# Patient Record
Sex: FEMALE | Race: WHITE | NOT HISPANIC OR LATINO | Employment: FULL TIME | URBAN - METROPOLITAN AREA
[De-identification: names, ages, dates, MRNs, and addresses within clinical notes are randomized per-mention and may not be internally consistent; named-entity substitution may affect disease eponyms.]

---

## 2017-03-03 ENCOUNTER — COMMUNICATION - HEALTHEAST (OUTPATIENT)
Dept: FAMILY MEDICINE | Facility: CLINIC | Age: 22
End: 2017-03-03

## 2017-03-28 ENCOUNTER — COMMUNICATION - HEALTHEAST (OUTPATIENT)
Dept: FAMILY MEDICINE | Facility: CLINIC | Age: 22
End: 2017-03-28

## 2017-04-04 ENCOUNTER — OFFICE VISIT - HEALTHEAST (OUTPATIENT)
Dept: FAMILY MEDICINE | Facility: CLINIC | Age: 22
End: 2017-04-04

## 2017-04-04 DIAGNOSIS — K59.00 CONSTIPATION: ICD-10-CM

## 2017-04-04 DIAGNOSIS — K92.1 BLOOD IN STOOL: ICD-10-CM

## 2017-04-04 DIAGNOSIS — R10.84 GENERALIZED ABDOMINAL PAIN: ICD-10-CM

## 2017-04-04 ASSESSMENT — MIFFLIN-ST. JEOR: SCORE: 1519.69

## 2017-04-07 ENCOUNTER — HOSPITAL ENCOUNTER (OUTPATIENT)
Dept: CT IMAGING | Facility: CLINIC | Age: 22
Discharge: HOME | End: 2017-04-07
Attending: NURSE PRACTITIONER

## 2017-04-07 DIAGNOSIS — R10.84 GENERALIZED ABDOMINAL PAIN: ICD-10-CM

## 2017-04-07 DIAGNOSIS — K59.00 CONSTIPATION: ICD-10-CM

## 2017-04-07 DIAGNOSIS — K92.1 BLOOD IN STOOL: ICD-10-CM

## 2017-05-31 ENCOUNTER — OFFICE VISIT (OUTPATIENT)
Dept: INTERNAL MEDICINE | Facility: CLINIC | Age: 22
End: 2017-05-31

## 2017-05-31 VITALS
TEMPERATURE: 97.8 F | BODY MASS INDEX: 20.06 KG/M2 | HEART RATE: 70 BPM | SYSTOLIC BLOOD PRESSURE: 109 MMHG | WEIGHT: 148.1 LBS | OXYGEN SATURATION: 99 % | DIASTOLIC BLOOD PRESSURE: 75 MMHG | RESPIRATION RATE: 18 BRPM | HEIGHT: 72 IN

## 2017-05-31 DIAGNOSIS — R53.83 OTHER FATIGUE: ICD-10-CM

## 2017-05-31 DIAGNOSIS — K59.09 OTHER CONSTIPATION: Primary | ICD-10-CM

## 2017-05-31 LAB
DEPRECATED CALCIDIOL+CALCIFEROL SERPL-MC: 60 UG/L (ref 20–75)
T4 FREE SERPL-MCNC: 0.9 NG/DL (ref 0.76–1.46)
TSH SERPL DL<=0.05 MIU/L-ACNC: 0.57 MU/L (ref 0.4–4)

## 2017-05-31 ASSESSMENT — PAIN SCALES - GENERAL: PAINLEVEL: MILD PAIN (2)

## 2017-05-31 NOTE — NURSING NOTE
Chief Complaint   Patient presents with     Consult     Here for consultation. Patient been having GI issues     Lion Nelson CMA at 11:56 AM on 5/31/2017

## 2017-05-31 NOTE — MR AVS SNAPSHOT
After Visit Summary   5/31/2017    Araceli Sellers    MRN: [id number]           Patient Information     Date Of Birth          1995        Visit Information        Provider Department      5/31/2017 12:00 PM Margaret Hendrickson MD [facility]        Today's Diagnoses     Other constipation    -  1    Other fatigue          Care Instructions    Recommendations:    Dr. Lisa Musa (PCP, IUD)  Labs today     Bowels:  Stop miralax and citrucel, no laxatives   Probiotic - lactobacillus and bifidobacterium (several strains), 30 B colonies daily. RAW, megaFlora  Breathing - before eating.     Diet recommendations:  Cooked greens - 2 c/day, any kind  Reduce the banana  Gluten free and diary free x 1 month  More cooked than raw veg   2-3 containers of water  Try to avoid the GF products  GF grains - oats (can be), millet, quinoa, rice (red, black, brown)    For sleep:  mangesium - at least 2 tsp BID   Consider Mag supplement (not powder) for before bed?           Follow-ups after your visit        Follow-up notes from your care team     Return in about 4 weeks (around 6/28/2017).      Your next 10 appointments already scheduled     May 31, 2017  1:30 PM CDT   LAB with [facility] ([facility])    [address] [place] [id number] [phone]           Patient must bring picture ID.  Patient should be prepared to give a urine specimen  Please do not eat 10-12 hours before your appointment if you are coming in fasting for labs on lipids, cholesterol, or glucose (sugar).  Pregnant women should follow their Care Team instructions. Water with medications is okay. Do not drink coffee or other fluids.   If you have concerns about taking  your medications, please ask at office or if scheduling via Next Caller, send a message by clicking on Secure Messaging, Message Your Care Team.            Jul 26, 2017  9:00 AM CDT  "  (Arrive by 8:45 AM)   Return Lifestyle with Margaret Hendrickson MD   [facility] ([facility])    [address]  [address] [facility] [address] [phone]              Who to contact     Please call your clinic at [phone] to:    Ask questions about your health    Make or cancel appointments    Discuss your medicines    Learn about your test results    Speak to your doctor   If you have compliments or concerns about an experience at your clinic, or if you wish to file a complaint, please contact [facility] Patient Relations at [phone] or email us at [email or web address]         Additional Information About Your Visit        Alt[employer] [email or web address] Information     Protecode is an electronic gateway that provides easy, online access to your medical records. With Protecode, you can request a clinic appointment, read your test results, renew a prescription or communicate with your care team.     To sign up for Protecode visit the website at [email or web address]   You will be asked to enter the access code listed below, as well as some personal information. Please follow the directions to create your username and password.     Your access code is: [email or web address]  Expires: 8/15/2017  6:30 AM     Your access code will  in 90 days. If you need help or a new code, please contact your [facility] or call [phone] for assistance.        Care EveryWhere ID     This is your Care EveryWhere ID. This could be used by other organizations to access your [facility] medical records  [id number]        Your Vitals Were     Pulse Temperature Respirations Height Pulse Oximetry Breastfeeding?    70 97.8  F (36.6  C) (Oral) 18 1.816 m (5' 11.5\") 99% No    BMI (Body Mass Index)                   20.37 kg/m2            Blood Pressure from Last 3 Encounters:   17 109/75   13 109/77    Weight from " Last 3 Encounters:   05/31/17 67.2 kg (148 lb 1.6 oz)   09/19/13 68.7 kg (151 lb 6.4 oz)              We Performed the Following     T3 Free     T4 free     TSH     Vitamin D deficiency screening          Today's Medication Changes          These changes are accurate as of: 5/31/17  1:27 PM.  If you have any questions, ask your nurse or doctor.               Stop taking these medicines if you haven't already. Please contact your care team if you have questions.     EFFEXOR 37.5 MG tablet   Generic drug:  venlafaxine   Stopped by:  Margaret Hendrickson MD                    Primary Care Provider    None Specified       No primary provider on file.        Thank you!     Thank you for choosing [facility]  for your care. Our goal is always to provide you with excellent care. Hearing back from our patients is one way we can continue to improve our services. Please take a few minutes to complete the written survey that you may receive in the mail after your visit with us. Thank you!             Your Updated Medication List - Protect others around you: Learn how to safely use, store and throw away your medicines at www.disposemymeds.org.          This list is accurate as of: 5/31/17  1:27 PM.  Always use your most recent med list.                   Brand Name Dispense Instructions for use    CELEXA PO      Take 10 mg by mouth daily       MELATONIN      1 tablet       NASONEX 50 MCG/ACT spray   Generic drug:  mometasone      Spray 2 sprays into both nostrils daily

## 2017-05-31 NOTE — PATIENT INSTRUCTIONS
Recommendations:    Dr. Lisa Musa (PCP, IUD)  Labs today     Bowels:  Stop miralax and citrucel, no laxatives   Probiotic - lactobacillus and bifidobacterium (several strains), 30 B colonies daily. RAW, megaFlora  Breathing - before eating.     Diet recommendations:  Cooked greens - 2 c/day, any kind  Reduce the banana  Gluten free and diary free x 1 month  More cooked than raw veg   2-3 containers of water  Try to avoid the GF products  GF grains - oats (can be), millet, quinoa, rice (red, black, brown)    For sleep:  mangesium - at least 2 tsp BID   Consider Mag supplement (not powder) for before bed?
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
## 2017-06-01 ENCOUNTER — TELEPHONE (OUTPATIENT)
Dept: INTERNAL MEDICINE | Facility: CLINIC | Age: 22
End: 2017-06-01

## 2017-06-01 LAB — T3FREE SERPL-MCNC: 2.6 PG/ML (ref 2.3–4.2)

## 2017-06-08 ASSESSMENT — ENCOUNTER SYMPTOMS
TROUBLE SWALLOWING: 0
SINUS CONGESTION: 0
ALTERED TEMPERATURE REGULATION: 1
BLOATING: 1
CHILLS: 0
CONSTIPATION: 1
POLYDIPSIA: 0
BLOOD IN STOOL: 0
HEARTBURN: 1
VOMITING: 1
RECTAL PAIN: 0
ABDOMINAL PAIN: 1
SMELL DISTURBANCE: 0
SORE THROAT: 0
JAUNDICE: 0
INCREASED ENERGY: 0
HOARSE VOICE: 0
WEIGHT GAIN: 0
RECTAL BLEEDING: 0
BOWEL INCONTINENCE: 0
TASTE DISTURBANCE: 0
DECREASED APPETITE: 1
NAIL CHANGES: 0
FATIGUE: 1
WEIGHT LOSS: 0
NAUSEA: 1
DIARRHEA: 0
HALLUCINATIONS: 0
NIGHT SWEATS: 0
POLYPHAGIA: 0
FEVER: 0
SINUS PAIN: 0
NECK MASS: 0
POOR WOUND HEALING: 0
SKIN CHANGES: 0

## 2017-06-08 ASSESSMENT — ACTIVITIES OF DAILY LIVING (ADL)
ARE_THERE_CARBON_MONOXIDE_DETECTORS_IN_YOUR_HOME?: N
ARE_THERE_SMOKE_DETECTORS_IN_YOUR_HOME?: N
ARE_THERE_FIREARMS_IN_YOUR_HOME?: N
DO_MEMBERS_OF_YOUR_HOUSEHOLD_USE_SUNSCREEN?: N
DO_MEMBERS_OF_YOUR_HOUSEHOLD_USE_SAFETY_HELMETS?: Y
DO_MEMBERS_OF_YOUR_HOUSEHOLD_WEAR_SEAT_BELTS?: Y

## 2017-06-13 ENCOUNTER — OFFICE VISIT (OUTPATIENT)
Dept: INTERNAL MEDICINE | Facility: CLINIC | Age: 22
End: 2017-06-13

## 2017-06-13 VITALS
HEART RATE: 92 BPM | WEIGHT: 143 LBS | BODY MASS INDEX: 19.37 KG/M2 | HEIGHT: 72 IN | SYSTOLIC BLOOD PRESSURE: 104 MMHG | OXYGEN SATURATION: 98 % | RESPIRATION RATE: 18 BRPM | DIASTOLIC BLOOD PRESSURE: 70 MMHG | TEMPERATURE: 98.1 F

## 2017-06-13 DIAGNOSIS — K59.00 CONSTIPATION, UNSPECIFIED CONSTIPATION TYPE: Primary | ICD-10-CM

## 2017-06-13 DIAGNOSIS — Z30.432 ENCOUNTER FOR IUD REMOVAL: ICD-10-CM

## 2017-06-13 DIAGNOSIS — R63.4 LOSS OF WEIGHT: ICD-10-CM

## 2017-06-13 ASSESSMENT — ENCOUNTER SYMPTOMS
PALPITATIONS: 0
SKIN CHANGES: 0
SINUS PAIN: 0
POLYPHAGIA: 0
SLEEP DISTURBANCES DUE TO BREATHING: 0
HYPERTENSION: 0
POSTURAL DYSPNEA: 0
NAUSEA: 1
STIFFNESS: 0
TACHYCARDIA: 0
HOT FLASHES: 0
INSOMNIA: 0
DOUBLE VISION: 0
EYE WATERING: 0
TROUBLE SWALLOWING: 0
BRUISES/BLEEDS EASILY: 0
DISTURBANCES IN COORDINATION: 0
DECREASED APPETITE: 1
HEMOPTYSIS: 0
HEMATURIA: 0
SORE THROAT: 0
TASTE DISTURBANCE: 0
BREAST PAIN: 0
BREAST MASS: 0
EYE REDNESS: 0
MYALGIAS: 0
PANIC: 0
NERVOUS/ANXIOUS: 0
NAIL CHANGES: 0
HOARSE VOICE: 0
MEMORY LOSS: 0
HALLUCINATIONS: 0
BOWEL INCONTINENCE: 0
RECTAL BLEEDING: 0
EXTREMITY NUMBNESS: 0
MUSCLE WEAKNESS: 0
ORTHOPNEA: 0
BLOATING: 1
WHEEZING: 0
POOR WOUND HEALING: 0
PARALYSIS: 0
DYSURIA: 0
ARTHRALGIAS: 0
BACK PAIN: 0
TREMORS: 0
INCREASED ENERGY: 0
EYE PAIN: 0
JOINT SWELLING: 0
COUGH DISTURBING SLEEP: 0
BLOOD IN STOOL: 0
NECK MASS: 0
EYE IRRITATION: 0
RESPIRATORY PAIN: 0
SINUS CONGESTION: 0
SHORTNESS OF BREATH: 0
NUMBNESS: 0
TINGLING: 0
SMELL DISTURBANCE: 0
SPUTUM PRODUCTION: 0
LEG PAIN: 0
FLANK PAIN: 0
LOSS OF CONSCIOUSNESS: 0
WEIGHT LOSS: 0
CHILLS: 0
SNORES LOUDLY: 0
ABDOMINAL PAIN: 1
WEIGHT GAIN: 0
CONSTIPATION: 1
DECREASED LIBIDO: 0
RECTAL PAIN: 0
WEAKNESS: 0
NECK PAIN: 0
DIARRHEA: 0
HYPOTENSION: 0
DIZZINESS: 0
FATIGUE: 1
SEIZURES: 0
CLAUDICATION: 0
LIGHT-HEADEDNESS: 0
JAUNDICE: 0
EXERCISE INTOLERANCE: 0
VOMITING: 1
COUGH: 0
DYSPNEA ON EXERTION: 0
DEPRESSION: 0
LEG SWELLING: 0
MUSCLE CRAMPS: 0
DIFFICULTY URINATING: 0
SYNCOPE: 0
ALTERED TEMPERATURE REGULATION: 1
FEVER: 0
NIGHT SWEATS: 0
HEADACHES: 0
POLYDIPSIA: 0
SWOLLEN GLANDS: 0
HEARTBURN: 1
SPEECH CHANGE: 0
DECREASED CONCENTRATION: 0

## 2017-06-13 ASSESSMENT — PAIN SCALES - GENERAL: PAINLEVEL: NO PAIN (0)

## 2017-06-13 NOTE — PATIENT INSTRUCTIONS
Primary [facility] Medication Refill Request Information:  * Please contact your pharmacy regarding ANY request for medication refills.  ** [facility] Prescription Fax = [phone]  * Please allow 3 business days for routine medication refills.  * Please allow 5 business days for controlled substance medication refills.     [facility] Test Result notification information:  *You will be notified with in 7-10 days of your appointment day regarding the results of your test.  If you are on MyChart you will be notified as soon as the provider has reviewed the results and signed off on them.    Follow up with Dr. Castle after Colonoscopy at [facility] [phone]       Colonoscopy at [facility]   Endoscopy Department  [facility]  Floor 1, Unit [address], Room [address] [place] [id number]  Phone: [phone]

## 2017-06-13 NOTE — MR AVS SNAPSHOT
After Visit Summary   6/13/2017    Araceli Sellers    MRN: [id number]           Patient Information     Date Of Birth          1995        Visit Information        Provider Department      6/13/2017 4:30 PM Lisa Velasquez MD [facility]        Today's Diagnoses     Constipation, unspecified constipation type    -  1    Loss of weight        Encounter for IUD removal          Care Instructions    Primary Care Center Medication Refill Request Information:  * Please contact your pharmacy regarding ANY request for medication refills.  ** [facility] Prescription Fax = [phone]  * Please allow 3 business days for routine medication refills.  * Please allow 5 business days for controlled substance medication refills.     Primary Care Center Test Result notification information:  *You will be notified with in 7-10 days of your appointment day regarding the results of your test.  If you are on MyChart you will be notified as soon as the provider has reviewed the results and signed off on them.    Follow up with Dr. Castle after Colonoscopy at [facility] [phone]       Colonoscopy at [facility]   Endoscopy Department  [facility]  Floor 1, [address], Room [address] [place] [address]  Phone: [phone]            Follow-ups after your visit        Additional Services     GASTROENTEROLOGY ADULT REF PROCEDURE ONLY       Last Lab Result: No results found for: CR  Body mass index is 19.67 kg/(m^2).     Needed:  No  Language:  English    Patient will be contacted to schedule procedure.     Please be aware that coverage of these services is subject to the terms and limitations of your health insurance plan.  Call member services at your health plan with any benefit or coverage questions.  Any procedures must be performed at a [facility] facility OR coordinated by your clinic's referral  office.    Please bring the following with you to your appointment:    (1) Any X-Rays, CTs or MRIs which have been performed.  Contact the facility where they were done to arrange for  prior to your scheduled appointment.    (2) List of current medications   (3) This referral request   (4) Any documents/labs given to you for this referral                  Your next 10 appointments already scheduled     Jul 26, 2017  9:00 AM CDT   (Arrive by 8:45 AM)   Return Lifestyle with Margaret Hendrickson MD   [facility] ([facility] [place])    [address]  4th [facility] [address] [phone]              Who to contact     Please call your clinic at [phone] to:    Ask questions about your health    Make or cancel appointments    Discuss your medicines    Learn about your test results    Speak to your doctor   If you have compliments or concerns about an experience at your clinic, or if you wish to file a complaint, please contact [facility] Patient Relations at [phone] or email us at [email or web address]         Additional Information About Your Visit        retsCloudhart Information     Power Plus Communicationst gives you secure access to your electronic health record. If you see a primary care provider, you can also send messages to your care team and make appointments. If you have questions, please call your primary care clinic.  If you do not have a primary care provider, please call [phone] and they will assist you.      Power Plus Communicationst is an electronic gateway that provides easy, online access to your medical records. With Babelgum, you can request a clinic appointment, read your test results, renew a prescription or communicate with your care team.     To access your existing account, please contact your [facility] or call [phone] for assistance.        Care EveryWhere ID     This is your Care  "EveryWhere ID. This could be used by other organizations to access your [place] medical records  [id number]        Your Vitals Were     Pulse Temperature Respirations Height Pulse Oximetry Breastfeeding?    92 98.1  F (36.7  C) (Oral) 18 1.816 m (5' 11.5\") 98% No    BMI (Body Mass Index)                   19.67 kg/m2            Blood Pressure from Last 3 Encounters:   06/13/17 104/70   05/31/17 109/75   09/19/13 109/77    Weight from Last 3 Encounters:   06/13/17 64.9 kg (143 lb)   05/31/17 67.2 kg (148 lb 1.6 oz)   09/19/13 68.7 kg (151 lb 6.4 oz)              We Performed the Following     GASTROENTEROLOGY ADULT REF PROCEDURE ONLY     REMOVE INTRAUTERINE DEVICE        Primary Care Provider Office Phone # Fax #    Lisa Sloane Matthews [phone]       [address]  [facility] [address]        Thank you!     Thank you for choosing [facility]  for your care. Our goal is always to provide you with excellent care. Hearing back from our patients is one way we can continue to improve our services. Please take a few minutes to complete the written survey that you may receive in the mail after your visit with us. Thank you!             Your Updated Medication List - Protect others around you: Learn how to safely use, store and throw away your medicines at www.disposemymeds.org.          This list is accurate as of: 6/13/17  5:32 PM.  Always use your most recent med list.                   Brand Name Dispense Instructions for use    CELEXA PO      Take 10 mg by mouth daily       MELATONIN      1 tablet       NASONEX 50 MCG/ACT spray   Generic drug:  mometasone      Spray 2 sprays into both nostrils daily         "

## 2017-06-13 NOTE — NURSING NOTE
Chief Complaint   Patient presents with     Establish Care     Here to establish care for new PCP     Lion Nelson CMA at 4:43 PM on 6/13/2017

## 2017-06-13 NOTE — PROGRESS NOTES
"      HPI:       Araceli Sellers is a 21 year old female with a history of depression and constipation who presents for the following  Patient presents with: Establish Care (Here to establish care for new PCP)    Problem, Medication and Allergy Lists were reviewed and are current.  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 She was recently seen by Dr. Hendrickson in Integrative Medicine for constipation since February. Prior to that she was being seen at [facility]. Since February she has had an abdominal CT, which per chart review \"revealed copious amount of stool\" despite the patient reportedly using multiple stool medications including Miralax, fiber, and laxatives. After her visit with Dr. Hendrickson she decided to try using only her Magnesium Calm supplement. She reports taking about 900 -1000 mg daily. Currently reports variable stools, including \"long skinny pieces, fluffy pieces, and that it just seems really abnormal.\" Reports daily small bowel movement in the am, but doesn't feel like it's complete. Denies diarrhea unless caused by laxatives. Affirms cramping, bloating, and \"like it's not moving at all.\" Denies sharp pain, blood in the stool, or black stools. Prior to February she thinks she was having daily bowel movements. She also reports weight loss of about 12 lbs since earlier this year. Per chart review she is down 5 lbs since visit on 5/31/17. She affirms decreased appetite related to bloating. She also reports vomiting once in May due to constipation. She affirms exercising regularly and adequate fluid intake.She is wondering if her symptoms could be caused by IBS.      Deisy is not currently sexually active and would like to have her Mirena IUD, which was placed 5 years ago in August, removed today. She does not want to do any other contraception at this time. " Her last pap smear was May 2016 and was NIL.     Patient is a new patient to this clinic and so  I reviewed/updated the Past Medical History, the Family History and the Social History.     Works as a HUC/CNA on 7D at [employer]         Review of Systems:   Review of Systems     Constitutional:  Positive for fatigue, decreased appetite and recent stressors. Negative for fever, chills, weight loss, weight gain, night sweats, height loss, post-operative complications, incisional pain, hallucinations, increased energy, hyperactivity and confused.   HENT:  Positive for nosebleeds. Negative for ear pain, hearing loss, tinnitus, trouble swallowing, hoarse voice, mouth sores, sore throat, ear discharge, tooth pain, gum tenderness, taste disturbance, smell disturbance, hearing aid, bleeding gums, dry mouth, sinus pain, sinus congestion and neck mass.    Eyes:  Negative for double vision, pain, redness, eye pain, decreased vision, eye watering, eye bulging, eye dryness, flashing lights, spots, floaters, strabismus, tunnel vision, jaundice and eye irritation.   Respiratory:   Negative for cough, hemoptysis, sputum production, shortness of breath, wheezing, sleep disturbances due to breathing, snores loudly, respiratory pain, dyspnea on exertion, cough disturbing sleep and postural dyspnea.    Cardiovascular:  Negative for chest pain, dyspnea on exertion, palpitations, orthopnea, claudication, leg swelling, fingers/toes turn blue, hypertension, hypotension, syncope, history of heart murmur, chest pain on exertion, chest pain at rest, pacemaker, few scattered varicosities, leg pain, sleep disturbances due to breathing, tachycardia, light-headedness and exercise intolerance.   Gastrointestinal:  Positive for heartburn, nausea, vomiting, abdominal pain, constipation, bloating and change in stool. Negative for diarrhea, blood in stool, melena, rectal pain, hemorrhoids, bowel incontinence, jaundice, rectal bleeding and coffee ground  "emesis.   Genitourinary:  Negative for bladder incontinence, dysuria, urgency, hematuria, flank pain, vaginal discharge, difficulty urinating, genital sores, dyspareunia, decreased libido, nocturia, voiding less frequently, arousal difficulty, abnormal vaginal bleeding, excessive menstruation, menstrual changes, hot flashes, vaginal dryness and postmenopausal bleeding.   Musculoskeletal:  Negative for myalgias, back pain, joint swelling, arthralgias, stiffness, muscle cramps, neck pain, bone pain, muscle weakness and fracture.   Skin:  Positive for itching, hair changes and acne. Negative for nail changes, poor wound healing, rash, skin changes, warts, poor wound healing, scarring, flaky skin, Raynaud's phenomenon, sensitivity to sunlight and skin thickening.   Neurological:  Negative for dizziness, tingling, tremors, speech change, seizures, loss of consciousness, weakness, light-headedness, numbness, headaches, disturbances in coordination, extremity numbness, memory loss, difficulty walking and paralysis.   Endo/Heme:  Negative for anemia, swollen glands and bruises/bleeds easily.   Psychiatric/Behavioral:  Negative for depression, hallucinations, memory loss, decreased concentration, mood swings and panic attacks.    Breast:  Negative for breast discharge, breast mass, breast pain and nipple retraction.   Endocrine:  Positive for altered temperature regulation.Negative for polyphagia, polydipsia, unwanted hair growth and change in facial hair.     Annual Exam:  Frequency of exercise:: 4-5 days/week  Duration of exercise:: 45-60 minutes         Physical Exam:   /70 (BP Location: Right arm, Patient Position: Chair, Cuff Size: Adult Regular)  Pulse 92  Temp 98.1  F (36.7  C) (Oral)  Resp 18  Ht 1.816 m (5' 11.5\")  Wt 64.9 kg (143 lb)  SpO2 98%  Breastfeeding? No  BMI 19.67 kg/m2  Body mass index is 19.67 kg/(m^2).  Vitals were reviewed     Physical Exam   General: Deisy is alert, cooperative and in no " acute distress.   Skin: No lesions or rashes   Eyes: PERRL.   ENT: TMs are pearly grey, with light reflex and bony landmarks visible, bilaterally. Oropharynx and palate are pink, tonsils +1 without exudate. Uvula rises evenly.  Neck: Thyroid palpable, not enlarged or tender. No lymphadenopathy.  Chest & Lungs: Respirations are unlabored and regular, breath sounds are clear and equal throughout, bilaterally.  Cardiovascular: regular S1 and S2. No murmurs.  Peripheral pulses are +2. No edema.    Abdomen: Bowel sounds active x4 quadrants. No tenderness or palpable masses. No hepatosplenomegaly.   Musculoskeletal: Full ROM in all joints.     Neurological: Alert and oriented X3.   GYN: No vaginal discharge, cervix os normal    Procedure: IUD strings were visualized from cervical os. Strings were grasped with vaginal forceps and IUD was removed in entirety. No complications, tolerated well.     Assessment and Plan     Araceli was seen today for establish care. Discussed with Deisy that IBS is more a diagnosis of exclusion. Given her 4-5 month history of constipation and abdominal discomfort despite aggressive medication regimen I think it would be appropriate to do a colonoscopy to further evaluate etiology, especially with such sudden onset. If colonoscopy is normal, will consider starting medication therapy for IBS symptoms. She would like to proceed with colonoscopy.       Diagnoses and all orders for this visit:    Constipation, unspecified constipation type  -     GASTROENTEROLOGY ADULT REF PROCEDURE ONLY    Loss of weight  -     GASTROENTEROLOGY ADULT REF PROCEDURE ONLY    Encounter for IUD removal  -     REMOVE INTRAUTERINE DEVICE      Options for treatment and follow-up care were reviewed with the patient. Araceli Sellers engaged in the decision making process and verbalized understanding of the options discussed and agreed with the final plan.    Scribe Disclosure:   Yesenia PRATT - NP Student, am serving  as a scribe; to document services personally performed by Dr. Lisa Matthews-based on data collection and the provider's statements to me.     Provider Disclosure:  I agree with above History, Review of Systems, Physical exam and Plan. I have reviewed the content of the documentation and have edited it as needed. I have personally performed the services documented here and the documentation accurately represents those services and the decisions I have made.     Lisa Matthews MD  Jun 13, 2017

## 2017-06-14 ENCOUNTER — TELEPHONE (OUTPATIENT)
Dept: GASTROENTEROLOGY | Facility: CLINIC | Age: 22
End: 2017-06-14

## 2017-06-22 ENCOUNTER — TELEPHONE (OUTPATIENT)
Dept: GASTROENTEROLOGY | Facility: OUTPATIENT CENTER | Age: 22
End: 2017-06-22

## 2017-06-22 NOTE — TELEPHONE ENCOUNTER
Patient taking any blood thinners ? no    Heart disease ? denies    Lung disease ? denies      Sleep apnea ? denies    Diabetic ? denies    Kidney disease ? denies    Dialysis ? n/a    Electronic implanted medical devices ? denies    Are you taking any narcotic pain medication ? no  What is your daily dosage ?    PTSD ?n/a    Prep instructions reviewed with patient ? Instructions,  policy, conscious sedation plan reviewed. patient is doing an extended prep . Advised patient to have someone stay with her post exam. Patient states she is not pregnant or lactating.    Pharmacy : [place]    Indication for procedure :  Associated Diagnoses      Constipation, unspecified constipation type [K59.00]  - Primary         Loss of weight [R63.4]             Referring provider :  Providers      Authorizing Provider Encounter Provider     Lisa Velasquez MD

## 2017-06-29 ENCOUNTER — DOCUMENTATION ONLY (OUTPATIENT)
Dept: GASTROENTEROLOGY | Facility: OUTPATIENT CENTER | Age: 22
End: 2017-06-29

## 2017-06-29 ENCOUNTER — TRANSFERRED RECORDS (OUTPATIENT)
Dept: HEALTH INFORMATION MANAGEMENT | Facility: CLINIC | Age: 22
End: 2017-06-29

## 2017-06-29 DIAGNOSIS — K59.00 CONSTIPATION: Primary | ICD-10-CM

## 2017-06-30 ENCOUNTER — MYC MEDICAL ADVICE (OUTPATIENT)
Dept: INTERNAL MEDICINE | Facility: CLINIC | Age: 22
End: 2017-06-30

## 2017-06-30 DIAGNOSIS — K58.1 IRRITABLE BOWEL SYNDROME WITH CONSTIPATION: Primary | ICD-10-CM

## 2017-07-02 RX ORDER — LUBIPROSTONE 8 UG/1
8 CAPSULE ORAL 2 TIMES DAILY
Qty: 60 CAPSULE | Refills: 3 | Status: SHIPPED | OUTPATIENT
Start: 2017-07-02 | End: 2020-04-28

## 2017-07-03 LAB — COPATH REPORT: NORMAL

## 2017-09-12 DIAGNOSIS — R14.1 FLATULENCE, ERUCTATION, AND GAS PAIN: ICD-10-CM

## 2017-09-12 DIAGNOSIS — R14.3 FLATULENCE, ERUCTATION, AND GAS PAIN: ICD-10-CM

## 2017-09-12 DIAGNOSIS — R14.2 FLATULENCE, ERUCTATION, AND GAS PAIN: ICD-10-CM

## 2017-09-13 LAB
H PYLORI AG STL QL IA: NORMAL
SPECIMEN SOURCE: NORMAL

## 2017-09-21 ENCOUNTER — OFFICE VISIT (OUTPATIENT)
Dept: INTERNAL MEDICINE | Facility: CLINIC | Age: 22
End: 2017-09-21

## 2017-09-21 VITALS
SYSTOLIC BLOOD PRESSURE: 102 MMHG | BODY MASS INDEX: 20.63 KG/M2 | DIASTOLIC BLOOD PRESSURE: 66 MMHG | WEIGHT: 150 LBS | HEART RATE: 65 BPM | RESPIRATION RATE: 16 BRPM

## 2017-09-21 DIAGNOSIS — K59.09 OTHER CONSTIPATION: Primary | ICD-10-CM

## 2017-09-21 ASSESSMENT — PAIN SCALES - GENERAL: PAINLEVEL: NO PAIN (0)

## 2017-09-21 NOTE — PATIENT INSTRUCTIONS
Recommendations:    Large glass of water w/ 1 Sanam of lemon juice and ACV with the mother - first thing in the morning.   For breakfast - fat and protein. Eggs OR angie-hemp-flax porridge w/ nuts, nut butter, nut milk.   Rice - limiting to 3 sv/week  carbs - making sure they are complex/whole grain  Carolyn tea - good for nausea, warming, pro-motility.   Donnell greens, mustard greens, kale, spinach, chard... All wonderful - saute or in soup.  Consider adding in more plant proteins, clean animal proteins.    Dr. Marilu Martinez - website, has recipes and such.       Stone Age Bread  Healthy [place], Healthy Lives, Inspired by Haseeb Jessica    Preheat oven to 320 degrees F    Ingredients:    cup pumpkin seeds, whole    cup pumpkin seeds roughly chopped     cup sunflower seeds (raw, unsalted) whole    cup sunflower seeds roughly chopped    cup raw almonds, whole    cup walnuts, halves and pieces    cup ground flaxseed    cup angie seeds  ? cup sesame seeds  5 organic, cage-free eggs (4 eggs + 1 flax egg = 1 T ground flax + 2-3 T water, let it sit)   ? cup of EVOO or coconut oil    tsp salt  Method:  1. Combine all ingredients in a bowl with a wooden spoon, mashing the eggs gently to combine  2. Prepare an 8-inch x 4 inch loaf pan, greased with coconut oil or butter  3. Pour mixture into loaf padilla and bake at 320F for around 50 minutes or until a thermometer inserted in center of loaf reads 175F.   4. Allow to cool slightly, then slice and enjoy with hummus, pesto or honey.    Refrigerate unused portions.   NOTE: if you do not have the exact nuts or seeds above, it is fine to substitute similar ingredients in the same quantities (i.e. cashew pieces instead of SF or pumpkin seeds; pecans instead of walnuts, etc.).

## 2017-09-21 NOTE — MR AVS SNAPSHOT
After Visit Summary   9/21/2017    Araceli Sellers    MRN: [id number]           Patient Information     Date Of Birth          1995        Visit Information        Provider Department      9/21/2017 3:00 PM Margaret Hendrickson MD [facility]        Care Instructions    Recommendations:    Large glass of water w/ 1 Sanam of lemon juice and ACV with the mother - first thing in the morning.   For breakfast - fat and protein. Eggs OR angie-hemp-flax porridge w/ nuts, nut butter, nut milk.   Rice - limiting to 3 sv/week  carbs - making sure they are complex/whole grain  Carolyn tea - good for nausea, warming, pro-motility.   Donnell greens, mustard greens, kale, spinach, chard... All wonderful - saute or in soup.    Dr. Marilu Martinez - website, has recipes and such.       Stone Age Bread  Healthy [place], Healthy Lives, Inspired by Haseeb Jessica    Preheat oven to 320 degrees F    Ingredients:    cup pumpkin seeds, whole    cup pumpkin seeds roughly chopped     cup sunflower seeds (raw, unsalted) whole    cup sunflower seeds roughly chopped    cup raw almonds, whole    cup walnuts, halves and pieces    cup ground flaxseed    cup angie seeds  ? cup sesame seeds  5 organic, cage-free eggs (4 eggs + 1 flax egg = 1 T ground flax + 2-3 T water, let it sit)   ? cup of EVOO or coconut oil    tsp salt  Method:  1. Combine all ingredients in a bowl with a wooden spoon, mashing the eggs gently to combine  2. Prepare an 8-inch x 4 inch loaf pan, greased with coconut oil or butter  3. Pour mixture into loaf padilla and bake at 320F for around 50 minutes or until a thermometer inserted in center of loaf reads 175F.   4. Allow to cool slightly, then slice and enjoy with hummus, pesto or honey.    Refrigerate unused portions.   NOTE: if you do not have the exact nuts or seeds above, it is fine to substitute similar ingredients in the same quantities (i.e. cashew pieces instead of SF or pumpkin  seeds; pecans instead of walnuts, etc.).                   Follow-ups after your visit        Follow-up notes from your care team     Return in about 6 weeks (around 11/2/2017) for 4pm slot open - please schedule .      Your next 10 appointments already scheduled     Nov 02, 2017  4:00 PM CDT   (Arrive by 3:45 PM)   Return Lifestyle with Margaret Hendrickson MD   [facility] ([facility] [place])    [address]  4th [facility] [address] [phone]              Who to contact     Please call your clinic at [phone] to:    Ask questions about your health    Make or cancel appointments    Discuss your medicines    Learn about your test results    Speak to your doctor   If you have compliments or concerns about an experience at your clinic, or if you wish to file a complaint, please contact [facility] Patient Relations at [phone] or email us at [email or web address]         Additional Information About Your Visit        Beats Electronicshart Information     SIVIt gives you secure access to your electronic health record. If you see a primary care provider, you can also send messages to your care team and make appointments. If you have questions, please call your primary care clinic.  If you do not have a primary care provider, please call [phone] and they will assist you.      Worldscape is an electronic gateway that provides easy, online access to your medical records. With Worldscape, you can request a clinic appointment, read your test results, renew a prescription or communicate with your care team.     To access your existing account, please contact your [facility] or call [phone] for assistance.        Care EveryWhere ID     This is your Care EveryWhere ID. This could be used by other organizations to access your [place] medical records  [id number]        Your Vitals Were     Pulse  Respirations Breastfeeding? BMI (Body Mass Index)          65 16 No 20.63 kg/m2         Blood Pressure from Last 3 Encounters:   09/21/17 102/66   06/13/17 104/70   05/31/17 109/75    Weight from Last 3 Encounters:   09/21/17 68 kg (150 lb)   06/13/17 64.9 kg (143 lb)   05/31/17 67.2 kg (148 lb 1.6 oz)              Today, you had the following     No orders found for display         Today's Medication Changes          These changes are accurate as of: 9/21/17  4:02 PM.  If you have any questions, ask your nurse or doctor.               Stop taking these medicines if you haven't already. Please contact your care team if you have questions.     CELEXA PO   Stopped by:  Margaret Hendrickson MD                    Primary Care Provider Office Phone # Fax #    Lisa Sloane Corrine Matthews [phone]       [address] [place] [address] [address] [place] [address]        Equal Access to Services     [facility]: Hadii samantha ku hadasho Soosmanali, waaxda luqadaha, qaybta kaalmada adeegyada, waxay nanciin haytom capone . So [facility] [phone].    ATENCIÓN: Si habla español, tiene a samuel disposición servicios gratuitos de asistencia lingüística. LlMercy Health St[facility] [phone].    We comply with applicable federal civil rights laws and [place] laws. We do not discriminate on the basis of race, color, national origin, age, disability sex, sexual orientation or gender identity.            Thank you!     Thank you for choosing [facility]  for your care. Our goal is always to provide you with excellent care. Hearing back from our patients is one way we can continue to improve our services. Please take a few minutes to complete the written survey that you may receive in the mail after your visit with us. Thank you!             Your Updated Medication List - Protect others around you: Learn how to safely use, store and throw away your medicines at www.disposemymeds.org.          This list is accurate as  of: 9/21/17  4:02 PM.  Always use your most recent med list.                   Brand Name Dispense Instructions for use Diagnosis    Lubiprostone 8 MCG Caps capsule     60 capsule    Take 1 capsule (8 mcg) by mouth 2 times daily    Irritable bowel syndrome with constipation       MELATONIN      1 tablet        NASONEX 50 MCG/ACT spray   Generic drug:  mometasone      Spray 2 sprays into both nostrils daily

## 2017-09-21 NOTE — NURSING NOTE
Chief Complaint   Patient presents with     Gastrointestinal Problem     Patient is here to follow up with GI concerns     Radhika Sheppard CMA 3:04 PM on 9/21/2017.

## 2018-04-17 ENCOUNTER — TELEPHONE (OUTPATIENT)
Dept: FAMILY MEDICINE | Facility: CLINIC | Age: 23
End: 2018-04-17

## 2018-04-17 NOTE — TELEPHONE ENCOUNTER
"[facility]    Phone Message    May a detailed message be left on voicemail: yes    Reason for Call: Other: Pt would like a call back from Nurse Modi regarding \"Establishing care with a different provider, Dr. Polanco.\"  The visit code in [facility] would not accept NEW REESTABLISH or even NEW.  Our guidelines indicate that Dr. Polanco can take new pts.  Is that still accurate?  Pt needs a call back .  She wants to schedule with Dr. Polanco, please.     Action Taken: Message routed to:  [facility] ([facility] Clinic  "

## 2018-04-18 ASSESSMENT — ENCOUNTER SYMPTOMS
WEIGHT LOSS: 1
SKIN CHANGES: 0
INCREASED ENERGY: 0
HOT FLASHES: 0
FATIGUE: 1
HALLUCINATIONS: 0
DIARRHEA: 0
DECREASED APPETITE: 1
CONSTIPATION: 1
DECREASED LIBIDO: 0
POLYPHAGIA: 0
ALTERED TEMPERATURE REGULATION: 0
POLYDIPSIA: 0
NAUSEA: 1
BOWEL INCONTINENCE: 0
VOMITING: 0
POOR WOUND HEALING: 0
HEARTBURN: 1
FEVER: 0
ABDOMINAL PAIN: 1
RECTAL PAIN: 0
WEIGHT GAIN: 1
NIGHT SWEATS: 0
BLOATING: 1
BLOOD IN STOOL: 0
CHILLS: 0
JAUNDICE: 0
NAIL CHANGES: 0

## 2018-04-18 NOTE — TELEPHONE ENCOUNTER
Left patient a voicemail on 4/18/2018 @ 8:45 advising her that we scheduled her an appt with Dr. Giordano on 5/2/18 @ 1:30 and if that did not work for her to call me and I would help her reschedule.    Edita Nixon    Primary Care

## 2018-04-25 ENCOUNTER — TRANSFERRED RECORDS (OUTPATIENT)
Dept: HEALTH INFORMATION MANAGEMENT | Facility: CLINIC | Age: 23
End: 2018-04-25

## 2018-05-02 ENCOUNTER — OFFICE VISIT (OUTPATIENT)
Dept: INTERNAL MEDICINE | Facility: CLINIC | Age: 23
End: 2018-05-02
Payer: COMMERCIAL

## 2018-05-02 VITALS
WEIGHT: 144.4 LBS | TEMPERATURE: 98.3 F | SYSTOLIC BLOOD PRESSURE: 109 MMHG | RESPIRATION RATE: 18 BRPM | BODY MASS INDEX: 20.22 KG/M2 | DIASTOLIC BLOOD PRESSURE: 74 MMHG | HEART RATE: 89 BPM | HEIGHT: 71 IN

## 2018-05-02 DIAGNOSIS — Z11.3 SCREEN FOR STD (SEXUALLY TRANSMITTED DISEASE): Primary | ICD-10-CM

## 2018-05-02 DIAGNOSIS — Z11.3 SCREEN FOR STD (SEXUALLY TRANSMITTED DISEASE): ICD-10-CM

## 2018-05-02 DIAGNOSIS — Z30.430 ENCOUNTER FOR IUD INSERTION: ICD-10-CM

## 2018-05-02 RX ORDER — VENLAFAXINE 75 MG/1
75 TABLET ORAL DAILY
COMMUNITY
End: 2024-02-15

## 2018-05-02 ASSESSMENT — PAIN SCALES - GENERAL: PAINLEVEL: NO PAIN (0)

## 2018-05-02 NOTE — NURSING NOTE
Chief Complaint   Patient presents with     Establish Care     Patient is here to establish care for PCP     Contraception     Also here to discuss alternative contraception      Lion Nelson CMA ([facility]) at 1:32 PM on 5/2/2018

## 2018-05-02 NOTE — MR AVS SNAPSHOT
After Visit Summary   5/2/2018    Araceli Sellers    MRN: [id number]           Patient Information     Date Of Birth          1995        Visit Information        Provider Department      5/2/2018 1:30 PM Suha Wing MD [facility]        Today's Diagnoses     Screen for STD (sexually transmitted disease)    -  1    Encounter for IUD insertion          Care Instructions    Primary Care Center Medication Refill Request Information:  * Please contact your pharmacy regarding ANY request for medication refills.  ** [facility] Prescription Fax = [phone]  * Please allow 3 business days for routine medication refills.  * Please allow 5 business days for controlled substance medication refills.     Primary Care Center Test Result notification information:  *You will be notified with in 7-10 days of your appointment day regarding the results of your test.  If you are on MyChart you will be notified as soon as the provider has reviewed the results and signed off on them.    Primary Care Center [phone]       [facility]) [phone] ([address] [place] [address] [address])       Preparing for IUD Insertion     Prior to the Appointment::  Check with your insurance to ensure you have coverage for the IUD  If you are NOT on birth control, abstain from unprotected intercourse (sex without condoms) for 7 days prior to the appointment.   Failure to do so will result in IUD not being placed the day of the appointment.   If you are on birth control, continue your current form of birth control until 7 days after the IUD is inserted.   You need to be taking this correctly.   If you are missing pills, you also need to abstain from unprotected intercourse for 7 days prior to the appointment.   Failure to do so will result in IUD not being placed the day of the appointment.     Day of the Appointment:   Please arrive to lab 30 minutes prior to the scheduled  appointment for a urine pregnancy test.   You may take 600 mg of ibuprofen 60 minutes prior to the procedure to help reduce cramping.   If you require medication to relax for the appointment, please let us know when you schedule the appointment. You will need a  for after the procedure.     After the Appointment:   You will not be able to place anything in the vagina for seven days  No intercourse (sexual activity)  No tampons  No douches            Follow-ups after your visit        Additional Services     OB/GYN REFERRAL       Your provider has referred you to:  OB/GYN    Please be aware that coverage of these services is subject to the terms and limitations of your health insurance plan.  Call member services at your health plan with any benefit or coverage questions.      Please bring the following with you to your appointment:    (1) Any X-Rays, CTs or MRIs which have been performed.  Contact the facility where they were done to arrange for  prior to your scheduled appointment.   (2) List of current medications   (3) This referral request   (4) Any documents/labs given to you for this referral                  Your next 10 appointments already scheduled     Jul 25, 2018  9:00 AM CDT   (Arrive by 8:45 AM)   Return Lifestyle with Margaret Hendrickson MD   [facility] ([facility])    [address] [place] [address] [phone]              Future tests that were ordered for you today     Open Future Orders        Priority Expected Expires Ordered    HIV Antigen Antibody Combo Routine 5/2/2018 5/2/2019 5/2/2018    C. trachomatis PCR - Urine, Lab Collect Routine 5/2/2018 5/2/2019 5/2/2018    N. gonorrhea PCR - Urine, Lab Collect Routine 5/2/2018 5/2/2019 5/2/2018            Who to contact     Please call your clinic at [phone] to:    Ask questions about your health    Make or cancel appointments    Discuss your  "medicines    Learn about your test results    Speak to your doctor            Additional Information About Your Visit        [employer] Information     [employer] gives you secure access to your electronic health record. If you see a primary care provider, you can also send messages to your care team and make appointments. If you have questions, please call your primary care clinic.  If you do not have a primary care provider, please call [phone] and they will assist you.      [employer] is an electronic gateway that provides easy, online access to your medical records. With [employer], you can request a clinic appointment, read your test results, renew a prescription or communicate with your care team.     To access your existing account, please contact your [facility] or call [phone] for assistance.        Care EveryWhere ID     This is your Care EveryWhere ID. This could be used by other organizations to access your [place] medical records  [id number]        Your Vitals Were     Pulse Temperature Respirations Height Last Period Breastfeeding?    89 98.3  F (36.8  C) (Oral) 18 1.803 m (5' 11\") 04/27/2018 (Exact Date) No    BMI (Body Mass Index)                   20.14 kg/m2            Blood Pressure from Last 3 Encounters:   05/02/18 109/74   09/21/17 102/66   06/13/17 104/70    Weight from Last 3 Encounters:   05/02/18 65.5 kg (144 lb 6.4 oz)   09/21/17 68 kg (150 lb)   06/13/17 64.9 kg (143 lb)              We Performed the Following     OB/GYN REFERRAL        Primary Care Provider Office Phone # Fax #    Suha Ketty Ivory [phone] [address] [address]        Equal Access to Services     [facility]: Hadii samantha Feng, flavio greenwood, qarian diamondalmaorlin johnson. So [facility] [phone].    ATENCIÓN: Si habla español, tiene a samuel disposición servicios gratuitos de " asistencia lingüística. Laurita al [phone].    We comply with applicable federal civil rights laws and [place] laws. We do not discriminate on the basis of race, color, national origin, age, disability, sex, sexual orientation, or gender identity.            Thank you!     Thank you for choosing [facility]  for your care. Our goal is always to provide you with excellent care. Hearing back from our patients is one way we can continue to improve our services. Please take a few minutes to complete the written survey that you may receive in the mail after your visit with us. Thank you!             Your Updated Medication List - Protect others around you: Learn how to safely use, store and throw away your medicines at www.disposemymeds.org.          This list is accurate as of 5/2/18  1:58 PM.  Always use your most recent med list.                   Brand Name Dispense Instructions for use Diagnosis    Lubiprostone 8 MCG Caps capsule     60 capsule    Take 1 capsule (8 mcg) by mouth 2 times daily    Irritable bowel syndrome with constipation       MELATONIN      1 tablet as needed        NASONEX 50 MCG/ACT spray   Generic drug:  mometasone      Spray 2 sprays into both nostrils daily        venlafaxine 75 MG tablet    EFFEXOR     Take 75 mg by mouth daily

## 2018-05-02 NOTE — PATIENT INSTRUCTIONS
[facility] Medication Refill Request Information:  * Please contact your pharmacy regarding ANY request for medication refills.  ** [facility] Prescription Fax = [phone]  * Please allow 3 business days for routine medication refills.  * Please allow 5 business days for controlled substance medication refills.     [facility] Test Result notification information:  *You will be notified with in 7-10 days of your appointment day regarding the results of your test.  If you are on MyChart you will be notified as soon as the provider has reviewed the results and signed off on them.    [facility] [phone]       [facility] ([facility]) [phone] ([address], [address])       Preparing for IUD Insertion     Prior to the Appointment::  Check with your insurance to ensure you have coverage for the IUD  If you are NOT on birth control, abstain from unprotected intercourse (sex without condoms) for 7 days prior to the appointment.   Failure to do so will result in IUD not being placed the day of the appointment.   If you are on birth control, continue your current form of birth control until 7 days after the IUD is inserted.   You need to be taking this correctly.   If you are missing pills, you also need to abstain from unprotected intercourse for 7 days prior to the appointment.   Failure to do so will result in IUD not being placed the day of the appointment.     Day of the Appointment:   Please arrive to lab 30 minutes prior to the scheduled appointment for a urine pregnancy test.   You may take 600 mg of ibuprofen 60 minutes prior to the procedure to help reduce cramping.   If you require medication to relax for the appointment, please let us know when you schedule the appointment. You will need a  for after the procedure.     After the Appointment:   You will not be able to place anything in the vagina for seven days  No intercourse (sexual activity)  No  tampons  No douches

## 2018-05-03 LAB
C TRACH DNA SPEC QL NAA+PROBE: NEGATIVE
HIV 1+2 AB+HIV1 P24 AG SERPL QL IA: NONREACTIVE
N GONORRHOEA DNA SPEC QL NAA+PROBE: NEGATIVE
SPECIMEN SOURCE: NORMAL
SPECIMEN SOURCE: NORMAL

## 2018-05-07 NOTE — PROGRESS NOTES
Araceli was seen today for physical, re-establish care and discuss contraception.    She is a 22 year old female generally enjoying good health however she does endorse heavier and more irregular periods on ROS.  She is interested in discussing birth control options that would address both.  She tried taking the pill in the past but found she was very inconsistent in the dosing and would get breakthrough bleeding.  I think she would be a good candidate for the mirena IUD.  She initially wanted to consider the copper and was fearful of hormones but I explained that the effect is local not systemic.    No changes to past, family or social history.   ROS: 10 point ROS neg other than the symptoms noted above in the HPI.    PHYSICAL EXAM:  B/P: 109/74, T: 98.3, P: 89, R: 18  Constitutional: no distress, comfortable, pleasant   Eyes: anicteric, normal extra-ocular movements   Ears, Nose and Throat: tympanic membranes clear, nose clear and free of lesions, throat clear, neck supple with full range of motion, no thyromegaly.   Cardiovascular: regular rate and rhythm, normal S1 and S2, no murmurs, rubs or gallops, peripheral pulses full and symmetric   Respiratory: clear to auscultation, no wheezes or crackles, normal breath sounds   Gastrointestinal: positive bowel sounds, nontender, no hepatosplenomegaly, no masses   Musculoskeletal: full range of motion, no active joints   Skin: no concerning lesions, no jaundice   Neurological: normal gait, no tremor   Psychological: appropriate mood   Lymphatic: no cervical lymphadenopathy and no pedal edema        A/P    Screen for STD (sexually transmitted disease)  -     HIV Antigen Antibody Combo; Future  -     C. trachomatis PCR - Urine, Lab Collect; Future  -     N. gonorrhea PCR - Urine, Lab Collect; Future    Encounter for IUD insertion  -     OB/GYN REFERRAL    RTC one year or sooner prpat Polanco MD

## 2018-05-25 ENCOUNTER — MYC MEDICAL ADVICE (OUTPATIENT)
Dept: INTERNAL MEDICINE | Facility: CLINIC | Age: 23
End: 2018-05-25

## 2018-06-01 ASSESSMENT — ANXIETY QUESTIONNAIRES
1. FEELING NERVOUS, ANXIOUS, OR ON EDGE: NOT AT ALL
7. FEELING AFRAID AS IF SOMETHING AWFUL MIGHT HAPPEN: NOT AT ALL
5. BEING SO RESTLESS THAT IT IS HARD TO SIT STILL: NOT AT ALL
2. NOT BEING ABLE TO STOP OR CONTROL WORRYING: NOT AT ALL
3. WORRYING TOO MUCH ABOUT DIFFERENT THINGS: NOT AT ALL
4. TROUBLE RELAXING: SEVERAL DAYS
6. BECOMING EASILY ANNOYED OR IRRITABLE: NOT AT ALL
7. FEELING AFRAID AS IF SOMETHING AWFUL MIGHT HAPPEN: NOT AT ALL
GAD7 TOTAL SCORE: 1
GAD7 TOTAL SCORE: 1

## 2018-06-02 ASSESSMENT — ANXIETY QUESTIONNAIRES: GAD7 TOTAL SCORE: 1

## 2018-06-04 ENCOUNTER — OFFICE VISIT (OUTPATIENT)
Dept: OBGYN | Facility: CLINIC | Age: 23
End: 2018-06-04
Attending: OBSTETRICS & GYNECOLOGY
Payer: COMMERCIAL

## 2018-06-04 ENCOUNTER — MYC MEDICAL ADVICE (OUTPATIENT)
Dept: INTERNAL MEDICINE | Facility: CLINIC | Age: 23
End: 2018-06-04

## 2018-06-04 VITALS
WEIGHT: 145 LBS | BODY MASS INDEX: 20.3 KG/M2 | HEART RATE: 106 BPM | SYSTOLIC BLOOD PRESSURE: 113 MMHG | DIASTOLIC BLOOD PRESSURE: 74 MMHG | HEIGHT: 71 IN

## 2018-06-04 DIAGNOSIS — Z30.430 ENCOUNTER FOR IUD INSERTION: ICD-10-CM

## 2018-06-04 DIAGNOSIS — Z01.812 PRE-PROCEDURE LAB EXAM: Primary | ICD-10-CM

## 2018-06-04 DIAGNOSIS — K58.1 IRRITABLE BOWEL SYNDROME WITH CONSTIPATION: Primary | ICD-10-CM

## 2018-06-04 PROBLEM — Z30.431 INTRAUTERINE DEVICE SURVEILLANCE: Status: ACTIVE | Noted: 2018-06-04

## 2018-06-04 LAB
HCG UR QL: NEGATIVE
INTERNAL QC OK POCT: YES

## 2018-06-04 PROCEDURE — 58300 INSERT INTRAUTERINE DEVICE: CPT | Mod: ZF | Performed by: OBSTETRICS & GYNECOLOGY

## 2018-06-04 PROCEDURE — G0463 HOSPITAL OUTPT CLINIC VISIT: HCPCS | Mod: 25,ZF

## 2018-06-04 PROCEDURE — 25000125 ZZHC RX 250: Mod: ZF

## 2018-06-04 PROCEDURE — 81025 URINE PREGNANCY TEST: CPT | Mod: ZF | Performed by: OBSTETRICS & GYNECOLOGY

## 2018-06-04 ASSESSMENT — PAIN SCALES - GENERAL: PAINLEVEL: NO PAIN (0)

## 2018-06-04 NOTE — MR AVS SNAPSHOT
After Visit Summary   6/4/2018    Araceli Sellers    MRN: [id number]           Patient Information     Date Of Birth          1995        Visit Information        Provider Department      6/4/2018 9:30 AM Mimi Reed MD [facility]        Today's Diagnoses     Pre-procedure lab exam    -  1    Encounter for IUD insertion          Care Instructions      IUD pamphlet reviewed and given to patient.          Follow-ups after your visit        Follow-up notes from your care team     Return in about 6 weeks (around 7/16/2018) for string check .      Your next 10 appointments already scheduled     Jul 25, 2018  9:00 AM CDT   (Arrive by 8:45 AM)   Return Lifestyle with Margaret Hendrickson MD   [facility] ([facility])    [address] [place] [address] [phone]              Who to contact     Please call your clinic at [phone] to:    Ask questions about your health    Make or cancel appointments    Discuss your medicines    Learn about your test results    Speak to your doctor            Additional Information About Your Visit        MyChart Information     [employer] gives you secure access to your electronic health record. If you see a primary care provider, you can also send messages to your care team and make appointments. If you have questions, please call your primary care clinic.  If you do not have a primary care provider, please call [phone] and they will assist you.      [employer] is an electronic gateway that provides easy, online access to your medical records. With [employer], you can request a clinic appointment, read your test results, renew a prescription or communicate with your care team.     To access your existing account, please contact your [facility] or call [phone] for assistance.        Care EveryWhere ID     This is your Care  "EveryWhere ID. This could be used by other organizations to access your [place] medical records  [id number]        Your Vitals Were     Pulse Height Last Period Breastfeeding? BMI (Body Mass Index)       106 1.803 m (5' 11\") 06/03/2018 No 20.22 kg/m2        Blood Pressure from Last 3 Encounters:   06/04/18 113/74   05/02/18 109/74   09/21/17 102/66    Weight from Last 3 Encounters:   06/04/18 65.8 kg (145 lb)   05/02/18 65.5 kg (144 lb 6.4 oz)   09/21/17 68 kg (150 lb)              We Performed the Following     hCG qual urine POCT [POC7]     hCG qual urine POCT     Insertion of IUD [03345]          Today's Medication Changes          These changes are accurate as of 6/4/18 10:53 AM.  If you have any questions, ask your nurse or doctor.               Start taking these medicines.        Dose/Directions    levonorgestrel 20 MCG/24HR IUD   Commonly known as:  MIRENA (52 MG)   Used for:  Encounter for IUD insertion   Started by:  Mimi Reed MD        Dose:  1 each   1 each (20 mcg) by Intrauterine route once for 1 dose   Quantity:  1 each   Refills:  0            Where to get your medicines      Some of these will need a paper prescription and others can be bought over the counter.  Ask your nurse if you have questions.     You don't need a prescription for these medications     levonorgestrel 20 MCG/24HR IUD                Primary Care Provider Office Phone # Fax #    Suha Ketty Ivory [phone] [address] [address]        Equal Access to Services     [facility] : Hadii samantha bolton hadashsariah Sophil, waaxda luqadaha, qaybta kaalmada carmen, orlin whitaker. So [facility] [phone].    ATENCIÓN: Si habla español, tiene a samuel disposición servicios gratuitos de asistencia lingüística. Llame al [phone].    We comply with applicable federal civil rights laws and [place] laws. We do not discriminate on the basis of race, " color, national origin, age, disability, sex, sexual orientation, or gender identity.            Thank you!     Thank you for choosing [facility]  for your care. Our goal is always to provide you with excellent care. Hearing back from our patients is one way we can continue to improve our services. Please take a few minutes to complete the written survey that you may receive in the mail after your visit with us. Thank you!             Your Updated Medication List - Protect others around you: Learn how to safely use, store and throw away your medicines at www.disposemymeds.org.          This list is accurate as of 6/4/18 10:53 AM.  Always use your most recent med list.                   Brand Name Dispense Instructions for use Diagnosis    levonorgestrel 20 MCG/24HR IUD    MIRENA (52 MG)    1 each    1 each (20 mcg) by Intrauterine route once for 1 dose    Encounter for IUD insertion       Lubiprostone 8 MCG Caps capsule     60 capsule    Take 1 capsule (8 mcg) by mouth 2 times daily    Irritable bowel syndrome with constipation       MELATONIN      1 tablet as needed        NASONEX 50 MCG/ACT spray   Generic drug:  mometasone      Spray 2 sprays into both nostrils daily        venlafaxine 75 MG tablet    EFFEXOR     Take 75 mg by mouth daily

## 2018-06-04 NOTE — PROGRESS NOTES
"IUD Placement Procedure Note    SUBJECTIVE: Araceli Sellers is a 22 year old , here for IUD placement. She has questions about Le vs. Kyleena vs. Mirena. She has had a Mirena in the past, but is wondering if something with less hormone is better. She liked the Mirena and had no problems with irregular bleeding.    Verification of Procedure:  Just before the procedure begans through verbal and active participation of team members, I verified:     Initials   Patient Name TG   Patient  TG   Procedure to be performed TG     Consent:  Risks, benefits of treatment, and alternative options for contraception were discussed.  Patient's questions were elicited and answered.  Written consent was obtained and scanned into medical record.       OBJECTIVE: /74  Pulse 106  Ht 1.803 m (5' 11\")  Wt 65.8 kg (145 lb)  LMP 2018  Breastfeeding? No  BMI 20.22 kg/m2    Pelvic Exam:  EG/BUS: Normal genital architecture without lesions, erythema or abnormal secretions. Bartholin's, Urethra, Walbridge's glands are normal.   Vagina: moist, pink, rugae with odorles ssecretions  Cervix: no lesions  Uterus: anteverted,  and midposition,   Adnexa: Within normal limits and No masses, nodularity, tenderness    PROCEDURE NOTE  --  Mirena Insertion    Reason for Insertion:  contraception.    Pregnancy test: Negative    Counseling:  Patient counseled on efficacy, benefits, risks, potential side effects of IUD.  Insertion procedure and risk of infection, perforation, and spontaneous expulsion reviewed.   Advised to plan removal and/or replacement of IUD in 6 years from today or when desired.       Araceli was not pre-medicated prior to beginning the procedure.      Under sterile technique, cervix was visualized with a medium Graves speculum and prepped with Betadine solution. The uterus was sounded to 8 cm. The Mirena IUD insertion apparatus was prepared and placed through the cervix without significant resistance and deployed " at the fundus in the usual fashion. The strings were trimmed 3 cm from the external os.      Device Lot #: [id number]  Device Expiration Date: Nov 2020     EBL:  Minimal     Complications:  None      Araceli Sellers tolerated procedure well.    PLAN:      Counseled patient on the different types of IUD. Recommended Mirena, especially since she has had it before and liked it. Discussed the higher risk of breakthrough bleeding with Kyleena/Le. Written information on IUD use reviewed and given.  Symptoms to report reviewed. To report heavy bleeding, severe cramping, or abnormal vaginal discharge.  May take Ibuprofen 400-800 mg PO TID PRN or Naproxen 500 mg PO BID for cramping. Reminded to check for IUD strings every month. Patient has been counseled to use backup birth control method for 1 week.  Return to clinic in 4-6 weeks for string check. Araceli Sellers  verbalized understanding of instructions.    Patient seen with Dr. Brianna Hanson MD  Obstetrics & Gynecology, PGY1  June 4, 2018, 10:34 AM    I was present during the entire procedure detailed above.     Mimi Reed MD      Answers for HPI/ROS submitted by the patient on 6/1/2018   CHRISTELLE 7 TOTAL SCORE: 1  PHQ-2 Score: 0  General Symptoms: No  Skin Symptoms: No  HENT Symptoms: No  EYE SYMPTOMS: No  HEART SYMPTOMS: No  LUNG SYMPTOMS: No  INTESTINAL SYMPTOMS: No  URINARY SYMPTOMS: No  GYNECOLOGIC SYMPTOMS: No  BREAST SYMPTOMS: No  SKELETAL SYMPTOMS: No  BLOOD SYMPTOMS: No  NERVOUS SYSTEM SYMPTOMS: No  MENTAL HEALTH SYMPTOMS: No

## 2018-06-04 NOTE — LETTER
"2018     RE: Araceli Sellers  [address]  [facility] [address]     Dear Colleague,    Thank you for referring your patient, Araceli Sellers, to the [facility] at [facility]. Please see a copy of my visit note below.    IUD Placement Procedure Note    SUBJECTIVE: Araceli Sellers is a 22 year old , here for IUD placement. She has questions about Le vs. Kyleena vs. Mirena. She has had a Mirena in the past, but is wondering if something with less hormone is better. She liked the Mirena and had no problems with irregular bleeding.    Verification of Procedure:  Just before the procedure begans through verbal and active participation of team members, I verified:     Initials   Patient Name TG   Patient  TG   Procedure to be performed TG     Consent:  Risks, benefits of treatment, and alternative options for contraception were discussed.  Patient's questions were elicited and answered.  Written consent was obtained and scanned into medical record.       OBJECTIVE: /74  Pulse 106  Ht 1.803 m (5' 11\")  Wt 65.8 kg (145 lb)  LMP 2018  Breastfeeding? No  BMI 20.22 kg/m2    Pelvic Exam:  EG/BUS: Normal genital architecture without lesions, erythema or abnormal secretions. Bartholin's, Urethra, Wever's glands are normal.   Vagina: moist, pink, rugae with odorles ssecretions  Cervix: no lesions  Uterus: anteverted,  and midposition,   Adnexa: Within normal limits and No masses, nodularity, tenderness    PROCEDURE NOTE  --  Mirena Insertion    Reason for Insertion:  contraception.    Pregnancy test: Negative    Counseling:  Patient counseled on efficacy, benefits, risks, potential side effects of IUD.  Insertion procedure and risk of infection, perforation, and spontaneous expulsion reviewed.   Advised to plan removal and/or replacement of IUD in 6 years from today or when desired.       Araceli was not pre-medicated prior to " beginning the procedure.      Under sterile technique, cervix was visualized with a medium Graves speculum and prepped with Betadine solution. The uterus was sounded to 8 cm. The Mirena IUD insertion apparatus was prepared and placed through the cervix without significant resistance and deployed at the fundus in the usual fashion. The strings were trimmed 3 cm from the external os.      Device Lot #: [id number]  Device Expiration Date: Nov 2020     EBL:  Minimal     Complications:  None      Araceliishan Sellers tolerated procedure well.    PLAN:      Counseled patient on the different types of IUD. Recommended Mirena, especially since she has had it before and liked it. Discussed the higher risk of breakthrough bleeding with Kyleena/Le. Written information on IUD use reviewed and given.  Symptoms to report reviewed. To report heavy bleeding, severe cramping, or abnormal vaginal discharge.  May take Ibuprofen 400-800 mg PO TID PRN or Naproxen 500 mg PO BID for cramping. Reminded to check for IUD strings every month. Patient has been counseled to use backup birth control method for 1 week.  Return to clinic in 4-6 weeks for string check. Araceli Sellers  verbalized understanding of instructions.    Patient seen with Dr. Brianna Hanson MD  Obstetrics & Gynecology, PGY1  June 4, 2018, 10:34 AM    I was present during the entire procedure detailed above.     Mimi Reed MD      Answers for HPI/ROS submitted by the patient on 6/1/2018   CHRISTELLE 7 TOTAL SCORE: 1  PHQ-2 Score: 0  General Symptoms: No  Skin Symptoms: No  HENT Symptoms: No  EYE SYMPTOMS: No  HEART SYMPTOMS: No  LUNG SYMPTOMS: No  INTESTINAL SYMPTOMS: No  URINARY SYMPTOMS: No  GYNECOLOGIC SYMPTOMS: No  BREAST SYMPTOMS: No  SKELETAL SYMPTOMS: No  BLOOD SYMPTOMS: No  NERVOUS SYSTEM SYMPTOMS: No  MENTAL HEALTH SYMPTOMS: No    Again, thank you for allowing me to participate in the care of your patient.      Sincerely,    Mimi Reed MD

## 2018-06-18 ENCOUNTER — OFFICE VISIT (OUTPATIENT)
Dept: INTERNAL MEDICINE | Facility: CLINIC | Age: 23
End: 2018-06-18
Payer: COMMERCIAL

## 2018-06-18 VITALS
SYSTOLIC BLOOD PRESSURE: 105 MMHG | BODY MASS INDEX: 20.47 KG/M2 | HEART RATE: 80 BPM | WEIGHT: 146.8 LBS | TEMPERATURE: 97.7 F | DIASTOLIC BLOOD PRESSURE: 73 MMHG

## 2018-06-18 DIAGNOSIS — K59.04 CHRONIC IDIOPATHIC CONSTIPATION: ICD-10-CM

## 2018-06-18 DIAGNOSIS — M25.551 HIP PAIN, RIGHT: ICD-10-CM

## 2018-06-18 DIAGNOSIS — K59.04 CHRONIC IDIOPATHIC CONSTIPATION: Primary | ICD-10-CM

## 2018-06-18 LAB
ALBUMIN SERPL-MCNC: 4.2 G/DL (ref 3.4–5)
ALP SERPL-CCNC: 50 U/L (ref 40–150)
ALT SERPL W P-5'-P-CCNC: 22 U/L (ref 0–50)
ANION GAP SERPL CALCULATED.3IONS-SCNC: 7 MMOL/L (ref 3–14)
AST SERPL W P-5'-P-CCNC: 20 U/L (ref 0–45)
BILIRUB SERPL-MCNC: 0.5 MG/DL (ref 0.2–1.3)
BUN SERPL-MCNC: 12 MG/DL (ref 7–30)
C COLI+JEJUNI+LARI FUSA STL QL NAA+PROBE: NOT DETECTED
CALCIUM SERPL-MCNC: 8.7 MG/DL (ref 8.5–10.1)
CHLORIDE SERPL-SCNC: 103 MMOL/L (ref 94–109)
CO2 SERPL-SCNC: 27 MMOL/L (ref 20–32)
CREAT SERPL-MCNC: 0.75 MG/DL (ref 0.52–1.04)
EC STX1 GENE STL QL NAA+PROBE: NOT DETECTED
EC STX2 GENE STL QL NAA+PROBE: NOT DETECTED
ENTERIC PATHOGEN COMMENT: NORMAL
GFR SERPL CREATININE-BSD FRML MDRD: >90 ML/MIN/1.7M2
GLUCOSE SERPL-MCNC: 78 MG/DL (ref 70–99)
NOROV GI+II ORF1-ORF2 JNC STL QL NAA+PR: NOT DETECTED
POTASSIUM SERPL-SCNC: 3.8 MMOL/L (ref 3.4–5.3)
PROT SERPL-MCNC: 7.3 G/DL (ref 6.8–8.8)
RVA NSP5 STL QL NAA+PROBE: NOT DETECTED
SALMONELLA SP RPOD STL QL NAA+PROBE: NOT DETECTED
SHIGELLA SP+EIEC IPAH STL QL NAA+PROBE: NOT DETECTED
SODIUM SERPL-SCNC: 138 MMOL/L (ref 133–144)
V CHOL+PARA RFBL+TRKH+TNAA STL QL NAA+PR: NOT DETECTED
Y ENTERO RECN STL QL NAA+PROBE: NOT DETECTED

## 2018-06-18 ASSESSMENT — PAIN SCALES - GENERAL: PAINLEVEL: NO PAIN (0)

## 2018-06-18 NOTE — MR AVS SNAPSHOT
After Visit Summary   6/18/2018    Araceli Sellers    MRN: [id number]           Patient Information     Date Of Birth          1995        Visit Information        Provider Department      6/18/2018 9:00 AM Gabriel Plaza MD [facility]        Today's Diagnoses     Chronic idiopathic constipation    -  1    Hip pain, right          Care Instructions    Please go to the lab on the first floor before you leave today.     Gastroenterology [phone] ([facility] [place] 4th [place] S)           Follow-ups after your visit        Additional Services     GASTROENTEROLOGY ADULT REFERRAL       Preferred Location: [facility] [phone]      Please be aware that coverage of these services is subject to the terms and limitations of your health insurance plan.  Call member services at your health plan with any benefit or coverage questions.  Any procedures must be performed at a [place] facility OR coordinated by your clinic's referral office.    Please bring the following with you to your appointment:    (1) Any X-Rays, CTs or MRIs which have been performed.  Contact the facility where they were done to arrange for  prior to your scheduled appointment.    (2) List of current medications   (3) This referral request   (4) Any documents/labs given to you for this referral            PHYSICAL THERAPY REFERRAL       *This therapy referral will be filtered to a centralized scheduling office at [facility] and the patient will receive a call to schedule an appointment at a [place] location most convenient for them. *     [facility] provides Physical Therapy evaluation and treatment and many specialty services across the [place] system.  If requesting a specialty program, please choose from the list below.    If you have not heard from the scheduling office within 2 business days, please call [phone] for all locations, with the exception of  "[place], please call [phone] and [place], please call [phone]  Treatment: Evaluation & Treatment  Special Instructions/Modalities:   Special Programs:     Please be aware that coverage of these services is subject to the terms and limitations of your health insurance plan.  Call member services at your health plan with any benefit or coverage questions.      **Note to Provider:  If you are referring outside of [place] for the therapy appointment, please list the name of the location in the \"special instructions\" above, print the referral and give to the patient to schedule the appointment.                  Your next 10 appointments already scheduled     Jun 18, 2018 10:15 AM CDT   LAB with  LAB   [facility] ([facility])    [address] [place] [address]  1st [facility] [address] [phone]           Please do not eat 10-12 hours before your appointment if you are coming in fasting for labs on lipids, cholesterol, or glucose (sugar). This does not apply to pregnant women. Water, hot tea and black coffee (with nothing added) are okay. Do not drink other fluids, diet soda or chew gum.            Jul 25, 2018  9:00 AM CDT   (Arrive by 8:45 AM)   Return Lifestyle with Margaret Hendrickson MD   [facility] ([facility])    [address] [facility] [facility]  4th [facility] [address] [phone]              Future tests that were ordered for you today     Open Future Orders        Priority Expected Expires Ordered    Stool Ova and Parasite Routine 6/18/2018 6/18/2019 6/18/2018    Enteric Bacteria and Virus Panel by MARY Stool Routine  6/19/2019 6/18/2018    Comprehensive metabolic panel Routine 6/18/2018 7/2/2018 6/18/2018    Tissue transglutaminase alicia IgA and IgG Routine  6/19/2019 6/18/2018            Who to contact     Please call your clinic at [phone] to:    Ask questions about your health    Make or cancel " appointments    Discuss your medicines    Learn about your test results    Speak to your doctor            Additional Information About Your Visit        [employer] Information     [employer] gives you secure access to your electronic health record. If you see a primary care provider, you can also send messages to your care team and make appointments. If you have questions, please call your primary care clinic.  If you do not have a primary care provider, please call [phone] and they will assist you.      [employer] is an electronic gateway that provides easy, online access to your medical records. With [employer], you can request a clinic appointment, read your test results, renew a prescription or communicate with your care team.     To access your existing account, please contact your [facility] or call [phone] for assistance.        Care EveryWhere ID     This is your Care EveryWhere ID. This could be used by other organizations to access your [place] medical records  [id number]        Your Vitals Were     Pulse Temperature Last Period BMI (Body Mass Index)          80 97.7  F (36.5  C) (Oral) 06/03/2018 20.47 kg/m2         Blood Pressure from Last 3 Encounters:   06/18/18 105/73   06/04/18 113/74   05/02/18 109/74    Weight from Last 3 Encounters:   06/18/18 66.6 kg (146 lb 12.8 oz)   06/04/18 65.8 kg (145 lb)   05/02/18 65.5 kg (144 lb 6.4 oz)              We Performed the Following     GASTROENTEROLOGY ADULT REFERRAL     PHYSICAL THERAPY REFERRAL        Primary Care Provider Office Phone # Fax #    Suha Ivory [phone] [address] [address]        Equal Access to Services     [facility] CenterPRITESH : Hadii samantha Feng, waaxda luqadaha, qaybta kaalolrin ewing. So [facility] [phone].    ATENCIÓN: Si habla español, tiene a samuel disposición servicios gratuitos de asistencia  lingüística. Laurita al [phone].    We comply with applicable federal civil rights laws and [place] laws. We do not discriminate on the basis of race, color, national origin, age, disability, sex, sexual orientation, or gender identity.            Thank you!     Thank you for choosing [facility]  for your care. Our goal is always to provide you with excellent care. Hearing back from our patients is one way we can continue to improve our services. Please take a few minutes to complete the written survey that you may receive in the mail after your visit with us. Thank you!             Your Updated Medication List - Protect others around you: Learn how to safely use, store and throw away your medicines at www.disposemymeds.org.          This list is accurate as of 6/18/18  9:50 AM.  Always use your most recent med list.                   Brand Name Dispense Instructions for use Diagnosis    levonorgestrel 20 MCG/24HR IUD    MIRENA (52 MG)    1 each    1 each (20 mcg) by Intrauterine route once for 1 dose    Encounter for IUD insertion       Lubiprostone 8 MCG Caps capsule     60 capsule    Take 1 capsule (8 mcg) by mouth 2 times daily    Irritable bowel syndrome with constipation       MELATONIN      1 tablet as needed        NASONEX 50 MCG/ACT spray   Generic drug:  mometasone      Spray 2 sprays into both nostrils daily        venlafaxine 75 MG tablet    EFFEXOR     Take 75 mg by mouth daily

## 2018-06-18 NOTE — NURSING NOTE
Chief Complaint   Patient presents with     Gastrointestinal Problem     For past two years has been dealing with constipation, has tried multiple treatments without relief, roomate has been dealing with paracites.       Derm Problem     Itching skin.        Amber Carrion LPN at 8:55 AM on June 18, 2018

## 2018-06-18 NOTE — PROGRESS NOTES
ASSESSMENT/PLAN:  Patient with long standing constipation that started after a trip to [place] (March 2017).  She has what sounds like extensive work up in the past including CT scan and colonoscopy.  I did not see any labs other than thyroid.  She describes this as infrequent BMs with bloating discomfort and that she will only have bowel movements if she has diarrhea after taking a lot of medications.  She is worried now about parasites given her roommate has parasites with constipation.      - check O&P and bacteria/viral cultures given the diarrhea  - refer to GI - slow transit constipation vs IBS with constipation.  I also wonder if she could have such firm stool that she is having diarrhea leakage around it.  - CMP and TTG for celiac (lost weight and itchy)  - I wonder if this could also be Effexor which causes constipation in 9% of users according to [employer]    Gabriel Plaza MD, FACP      Chief complaint:  Araceli Sellers is a 23 year old female presents for   Chief Complaint   Patient presents with     Gastrointestinal Problem     For past two years has been dealing with constipation, has tried multiple treatments without relief, roomate has been dealing with paracites.       Derm Problem     Itching skin.         SUBJECTIVE:  She has been feeling very constipated last March 2017 - she was in [place] on vacation.  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  This does not strike me as anxiety.    She would have an urge to go every 1-2 days and it was normal stools  Now she will go for 6 days without bowel movements - it will be extreme bloating and cramping.  It is not hard stool.  She will feel bloated high in the stomach.  Enemas and suppositories do not work.  She will get diarrhea when it does come out and not formed.  She will get cramping pain but not sharp pain.  No rectal pain.  She did have bleeding one time which led to the scope - none recently.  She has had abdominal scan and colonoscopy which was  normal.    She has tried IBS meds for constipation.  2 days ago she had a mediocre bowel movement  She has cut back on gluten and lactose.    She is very itchy.  It is itchy from the inside for 2 years - before this started.  No rash or color change.  If she scratches it does not get better.    Her roommate has active parasites from when she went to [place] despite therapy.  She is worried that she has parasites.  She made an acute appt with me today to address this.    Medications and allergies were reviewed by me today.     Review Of Systems  Constitutional: no fevers, night sweats or unintentional weight change (lost 10lbs without trying - not hungry because is so bloated)  Eyes: no yellowing of the eyes  : no periods with Mirena  GI: she has heart burn symptoms - was bad as a kid but now better    Patient Active Problem List    Diagnosis Date Noted     Mirena IUD placed 6/4/18 06/04/2018     Priority: Medium       PHYSICAL EXAM:  /73  Pulse 80  Temp 97.7  F (36.5  C) (Oral)  Wt 66.6 kg (146 lb 12.8 oz)  LMP 06/03/2018  BMI 20.47 kg/m2  Constitutional: no distress, comfortable, pleasant   Gastrointestinal: positive bowel sounds, nontender, no hepatosplenomegaly, no masses other than some fullness in the right lower quadrant      CT scan 4/7/2017:  CONCLUSION:  1.  No acute inflammatory process seen within the abdomen or pelvis.  2.  Copious stool in the colon, question constipation.  3.  No CT finding seen to explain blood in stool.  4.  Intrauterine device in place. Small volume free fluid in the pelvis, likely physiologic.   Result Narrative   CT ABDOMEN PELVIS W ORAL W IV CONTRAST  4/7/2017 1:33 PM        INDICATION: Blood in stool.  TECHNIQUE: CT abdomen and pelvis. Multiplanar reformation images (MPR). Dose reduction techniques were used.   IV CONTRAST: Iohexol (Omni) 100 mL.  COMPARISON: None.    FINDINGS:  LUNG BASES: Heart size normal. No pericardial thickening or effusion. No hiatal hernia.  No pneumonic consolidation or pleural effusion.    ABDOMEN: Normal liver, gallbladder, pancreas, spleen, adrenal glands, and kidneys. Gallbladder unremarkable on CT. Portal, splenic and superior mesenteric veins are patent. No evidence for biliary ductal dilatation.    Nothing to suggest intestinal obstruction. No definite focal bowel wall thickening identified. Copious stool throughout the colon. No free intraperitoneal gas, organized intra-abdominal fluid collection or mesenteric root adenopathy. Abdominal aorta   normal in caliber. No finding to suggest appendicitis.    PELVIS: Intrauterine device in place. Small volume free fluid in the pelvis, probably physiologic. No dominant adnexal mass. Bladder unremarkable on CT. No bulky pelvic adenopathy.    MUSCULOSKELETAL: Negative acute. Small bone island in the right posterior iliac bone. Small bone island in the left proximal femur.

## 2018-06-19 LAB
O+P STL MICRO: NORMAL
O+P STL MICRO: NORMAL
SPECIMEN SOURCE: NORMAL
TTG IGA SER-ACNC: <1 U/ML
TTG IGG SER-ACNC: <1 U/ML

## 2019-06-06 ENCOUNTER — OFFICE VISIT (OUTPATIENT)
Dept: INTERNAL MEDICINE | Facility: CLINIC | Age: 24
End: 2019-06-06
Payer: COMMERCIAL

## 2019-06-06 VITALS
HEART RATE: 100 BPM | BODY MASS INDEX: 20.33 KG/M2 | HEIGHT: 71 IN | WEIGHT: 145.2 LBS | DIASTOLIC BLOOD PRESSURE: 74 MMHG | SYSTOLIC BLOOD PRESSURE: 109 MMHG

## 2019-06-06 DIAGNOSIS — R53.83 TIREDNESS: ICD-10-CM

## 2019-06-06 DIAGNOSIS — R87.610 PAPANICOLAOU SMEAR OF CERVIX WITH ATYPICAL SQUAMOUS CELLS OF UNDETERMINED SIGNIFICANCE (ASC-US): Primary | ICD-10-CM

## 2019-06-06 DIAGNOSIS — N63.0 BREAST MASS: ICD-10-CM

## 2019-06-06 DIAGNOSIS — Z11.3 SCREEN FOR STD (SEXUALLY TRANSMITTED DISEASE): ICD-10-CM

## 2019-06-06 DIAGNOSIS — R53.83 FATIGUE, UNSPECIFIED TYPE: ICD-10-CM

## 2019-06-06 DIAGNOSIS — Z12.4 ENCOUNTER FOR PAPANICOLAOU SMEAR FOR CERVICAL CANCER SCREENING: ICD-10-CM
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
## 2019-06-06 LAB
ANION GAP SERPL CALCULATED.3IONS-SCNC: 6 MMOL/L (ref 3–14)
BUN SERPL-MCNC: 14 MG/DL (ref 7–30)
CALCIUM SERPL-MCNC: 8.8 MG/DL (ref 8.5–10.1)
CHLORIDE SERPL-SCNC: 105 MMOL/L (ref 94–109)
CO2 SERPL-SCNC: 27 MMOL/L (ref 20–32)
CREAT SERPL-MCNC: 0.77 MG/DL (ref 0.52–1.04)
ERYTHROCYTE [DISTWIDTH] IN BLOOD BY AUTOMATED COUNT: 12.2 % (ref 10–15)
GFR SERPL CREATININE-BSD FRML MDRD: >90 ML/MIN/{1.73_M2}
GLUCOSE SERPL-MCNC: 82 MG/DL (ref 70–99)
HCT VFR BLD AUTO: 42.4 % (ref 35–47)
HGB BLD-MCNC: 14.4 G/DL (ref 11.7–15.7)
MCH RBC QN AUTO: 33 PG (ref 26.5–33)
MCHC RBC AUTO-ENTMCNC: 34 G/DL (ref 31.5–36.5)
MCV RBC AUTO: 97 FL (ref 78–100)
PLATELET # BLD AUTO: 171 10E9/L (ref 150–450)
POTASSIUM SERPL-SCNC: 4 MMOL/L (ref 3.4–5.3)
RBC # BLD AUTO: 4.36 10E12/L (ref 3.8–5.2)
SODIUM SERPL-SCNC: 138 MMOL/L (ref 133–144)
SPECIMEN SOURCE: NORMAL
TSH SERPL DL<=0.005 MIU/L-ACNC: 0.82 MU/L (ref 0.4–4)
WBC # BLD AUTO: 5.2 10E9/L (ref 4–11)
WET PREP SPEC: NORMAL

## 2019-06-06 ASSESSMENT — ENCOUNTER SYMPTOMS
WEIGHT GAIN: 0
JAUNDICE: 0
INCREASED ENERGY: 0
ABDOMINAL PAIN: 0
FEVER: 0
SKIN CHANGES: 0
ALTERED TEMPERATURE REGULATION: 0
BLOOD IN STOOL: 0
POLYDIPSIA: 0
NAUSEA: 0
CHILLS: 0
POOR WOUND HEALING: 0
POLYPHAGIA: 0
FATIGUE: 1
BOWEL INCONTINENCE: 0
BREAST MASS: 1
RECTAL PAIN: 0
NIGHT SWEATS: 0
DECREASED APPETITE: 0
NAIL CHANGES: 0
HEARTBURN: 1
HALLUCINATIONS: 0
WEIGHT LOSS: 0
DIARRHEA: 0
BREAST PAIN: 0
CONSTIPATION: 1
BLOATING: 1
VOMITING: 0

## 2019-06-06 ASSESSMENT — MIFFLIN-ST. JEOR: SCORE: 1495.78

## 2019-06-06 ASSESSMENT — PAIN SCALES - GENERAL: PAINLEVEL: NO PAIN (0)

## 2019-06-06 NOTE — NURSING NOTE
"Chief Complaint   Patient presents with     Physical     pt is here for an annual physical        Vital signs:      BP: 109/74 Pulse: 100           Height: 179.7 cm (5' 10.75\") Weight: 65.9 kg (145 lb 3.2 oz)  Estimated body mass index is 20.39 kg/m  as calculated from the following:    Height as of this encounter: 1.797 m (5' 10.75\").    Weight as of this encounter: 65.9 kg (145 lb 3.2 oz).      Peggy Jaramillo CMA at 1:49 PM on 6/6/2019  "

## 2019-06-07 LAB
C TRACH DNA SPEC QL NAA+PROBE: NEGATIVE
N GONORRHOEA DNA SPEC QL NAA+PROBE: NEGATIVE
SPECIMEN SOURCE: NORMAL
SPECIMEN SOURCE: NORMAL

## 2019-06-07 NOTE — PROGRESS NOTES
"                     PRIMARY CARE CENTER       SUBJECTIVE:  Araceli Sellers is a 23 year old female with a PMHx of anxiety, MDD and IBS who comes in for yearly physical.     She has been feeling well. GI symptoms under control with daily Miralax.     Patient does report new left breast lesion in the medial aspect next to sternum, which she noticed about a week ago. Lesion is described as small, round, not enlarging, not tender. Denies any erythema or skin color change. No trauma. She also does not have any nipple discharges. Few years ago she had a similar lesion and ultrasound showed a benign fibroadenoma.     Otherwise denies fever, chills, night sweats, headache, vision changes, sore throat, rhinorrhea, cough, chest pain, shortness of breath, palpitations, abdominal pain, nausea, vomiting, diarrhea, constipation, hematochezia, melena, dysuria, hematuria, joint pain, joint swelling, new rash, presyncope or syncope.     Occasionally feels more tired and lethargic.     She does not smoke or use other substances. Has about 5 drinks per week. She is sexually active with one partner and wears condoms occassionally. Has never had an STD. Patient is a nurse.     Medications and allergies reviewed by me today.     ROS:   Constitutional, neuro, ENT, endocrine, pulmonary, cardiac, gastrointestinal, genitourinary, musculoskeletal, integument and psychiatric systems are negative, except as otherwise noted.    OBJECTIVE:    /74   Pulse 100   Ht 1.797 m (5' 10.75\")   Wt 65.9 kg (145 lb 3.2 oz)   BMI 20.39 kg/m     Wt Readings from Last 1 Encounters:   06/06/19 65.9 kg (145 lb 3.2 oz)       GENERAL APPEARANCE: healthy, alert and no distress     EYES: EOMI, PERRL     HENT: ear canals and TM's normal and nose and mouth without ulcers or lesions     NECK: no adenopathy, no asymmetry, masses, or scars and thyroid normal to palpation     RESP: lungs clear to auscultation - no rales, rhonchi or wheezes     BREAST: right " sided breast mass in the lateral aspect ~1cm, mobile and circular non tender; left sided medial round breast mass ~0.5cm, moveable, not tender; no nipple discharge noted     CV: regular rates and rhythm, normal S1 S2, no S3 or S4 and no murmur, click or rub     ABDOMEN:  soft, nontender, no HSM or masses and bowel sounds normal     MS: extremities normal- no gross deformities noted, no evidence of inflammation in joints, FROM in all extremities.     SKIN: no suspicious lesions or rashes     NEURO: Normal strength and tone, sensory exam grossly normal, mentation intact and speech normal     PSYCH: mentation appears normal. and affect normal/bright     LYMPHATICS: No cervical adenopathy     : scant blood in vaginal vault, no purulent secretion, normal appearing cervix without discharge      ASSESSMENT/PLAN:    Araceli was seen today for physical.    Diagnoses and all orders for this visit:    Bilateral breast mass       - US Breast Bilateral Complete 4 Quadrants; Future    Encounter for Papanicolaou smear for cervical cancer screening  -     Pap imaged thin layer screen reflex to HPV if ASCUS - recommend age 25 - 29  -     C. trachomatis PCR - Endocervical Swab, Clinic Collect  -     N. gonorrhea PCR - Endocervical Swab, Clinic Collect  -     Wet prep    Tiredness  -     CBC with platelets; Future  -     Basic metabolic panel; Future  -     TSH; Future    Fatigue, unspecified type  -     CBC with platelets; Future  -     Basic metabolic panel; Future  -     TSH; Future    Screen for STD (sexually transmitted disease)  -     C. trachomatis PCR - Endocervical Swab, Clinic Collect  -     N. gonorrhea PCR - Endocervical Swab, Clinic Collect  -     Wet prep    Accidental needlestick injury with exposure to body fluid  -     Cancel: Hepatitis C antibody  -     HIV Antigen Antibody Combo  -     Hepatitis C Screen Reflex to RNA FUTURE anytime; Future         Pt should return to clinic for f/u with me in 1-year.     Joe  MD Gina  Jun 6, 2019    Staffed with Dr. Trivedi    Pt was seen and examined with Dr. Fuentes.  I agree with his documentation as noted above.    My additional comments: None    Salvador Trivedi MD

## 2019-06-10 ENCOUNTER — ANCILLARY PROCEDURE (OUTPATIENT)
Dept: MAMMOGRAPHY | Facility: CLINIC | Age: 24
End: 2019-06-10
Payer: COMMERCIAL

## 2019-06-10 DIAGNOSIS — N63.20 MASSES OF BOTH BREASTS: ICD-10-CM
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
## 2019-06-10 DIAGNOSIS — N63.10 MASSES OF BOTH BREASTS: ICD-10-CM

## 2019-06-10 LAB
COPATH REPORT: NORMAL
HCV AB SERPL QL IA: NONREACTIVE
HIV 1+2 AB+HIV1 P24 AG SERPL QL IA: NONREACTIVE
PAP: NORMAL

## 2019-06-11 DIAGNOSIS — N63.20 LEFT BREAST MASS: Primary | ICD-10-CM
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
## 2019-11-08 NOTE — PATIENT INSTRUCTIONS
Please go to the lab on the first floor before you leave today.     Gastroenterology [phone] ([facility] [place]    no

## 2019-12-31 ENCOUNTER — OFFICE VISIT (OUTPATIENT)
Dept: FAMILY MEDICINE | Facility: CLINIC | Age: 24
End: 2019-12-31
Payer: COMMERCIAL

## 2019-12-31 VITALS
BODY MASS INDEX: 20.02 KG/M2 | TEMPERATURE: 98 F | HEIGHT: 71 IN | SYSTOLIC BLOOD PRESSURE: 118 MMHG | WEIGHT: 143 LBS | OXYGEN SATURATION: 100 % | RESPIRATION RATE: 16 BRPM | HEART RATE: 91 BPM | DIASTOLIC BLOOD PRESSURE: 82 MMHG

## 2019-12-31 DIAGNOSIS — R10.84 ABDOMINAL PAIN, GENERALIZED: ICD-10-CM

## 2019-12-31 DIAGNOSIS — R30.0 DYSURIA: Primary | ICD-10-CM

## 2019-12-31 DIAGNOSIS — K58.2 IRRITABLE BOWEL SYNDROME WITH BOTH CONSTIPATION AND DIARRHEA: ICD-10-CM

## 2019-12-31 DIAGNOSIS — N89.8 VAGINAL DISCHARGE: ICD-10-CM

## 2019-12-31 LAB
BILIRUBIN UR: NEGATIVE MG/DL
BLOOD UR: NEGATIVE MG/DL
CLARITY, URINE: CLEAR
CLUE CELLS: NORMAL
COLOR UR: YELLOW
GLUCOSE URINE: NEGATIVE
KETONES UR QL: NEGATIVE MG/DL
LEUKOCYTE ESTERASE UR: NEGATIVE
MOTILE TRICHOMONAS: NEGATIVE
NITRITE UR QL STRIP: NEGATIVE MG/DL
PH UR STRIP: 7 [PH] (ref 4.5–8)
PROTEIN UR: NEGATIVE MG/DL
SP GR UR STRIP: 1 (ref 1–1)
UROBILINOGEN UR STRIP-ACNC: NORMAL E.U./DL
YEAST: NORMAL

## 2019-12-31 ASSESSMENT — ANXIETY QUESTIONNAIRES
5. BEING SO RESTLESS THAT IT IS HARD TO SIT STILL: NOT AT ALL
GAD7 TOTAL SCORE: 3
7. FEELING AFRAID AS IF SOMETHING AWFUL MIGHT HAPPEN: NOT AT ALL
6. BECOMING EASILY ANNOYED OR IRRITABLE: NOT AT ALL
1. FEELING NERVOUS, ANXIOUS, OR ON EDGE: SEVERAL DAYS
IF YOU CHECKED OFF ANY PROBLEMS ON THIS QUESTIONNAIRE, HOW DIFFICULT HAVE THESE PROBLEMS MADE IT FOR YOU TO DO YOUR WORK, TAKE CARE OF THINGS AT HOME, OR GET ALONG WITH OTHER PEOPLE: SOMEWHAT DIFFICULT
3. WORRYING TOO MUCH ABOUT DIFFERENT THINGS: SEVERAL DAYS
2. NOT BEING ABLE TO STOP OR CONTROL WORRYING: NOT AT ALL

## 2019-12-31 ASSESSMENT — MIFFLIN-ST. JEOR: SCORE: 1480

## 2019-12-31 ASSESSMENT — PATIENT HEALTH QUESTIONNAIRE - PHQ9
SUM OF ALL RESPONSES TO PHQ QUESTIONS 1-9: 3
5. POOR APPETITE OR OVEREATING: SEVERAL DAYS

## 2019-12-31 NOTE — PROGRESS NOTES
GUILLAUME       Araceli Sellers is a 24 year old  who presents for   Chief Complaint   Patient presents with     Urinary Problem     Pt. presents to the clinic today with itchy vagina, frequent urination. X 2 days   24 year-old female presents with complaint of itching and frequent urination x 2 days.  Urination is not painful, just more frequent, not discolored and no blood.  Vaginal discharge is slight, whitish discharge. Used new soap and thinks it might have been that. Had yeast infection a few years ago. Notes slight odor recently. Sexually active; male partner x 1.5 years.  Denies risk for STI. This has been only partner for him and she was tested for STIs previously and was negative. Has IUD in place.  Denies abdominal pain, flank pain, back pain. Has nausea but this is chronic. Has history of irritable bowel syndrome with alternating constipation and diarrhea x 4 years which has greatly impacted her. She has tried numerous therapies without relief. Currently having loose stools, last this am. Has seen functional nutrition, GI, and various primary care providers. Has taken medications for IBS, Miralax and fiber supplements without improvement.         Problem, Medication and Allergy Lists were .    Patient is   a new patient to this clinic and so  I reviewed/updated the Past Medical History, the Family History and the Social History   Past Medical History:   Diagnosis Date     Bulimia 2012    Resolved     Constipation 2017     Depression 2010     Seasonal allergies      Family History   Problem Relation Age of Onset     Diabetes Maternal Grandfather      Hypertension Maternal Grandfather      Cardiovascular Maternal Grandfather      Social History     Socioeconomic History     Marital status: Single     Spouse name: None     Number of children: None     Years of education: None     Highest education level: None   Occupational History     None   Social Needs     Financial resource strain: None     Food  "insecurity:     Worry: None     Inability: None     Transportation needs:     Medical: None     Non-medical: None   Tobacco Use     Smoking status: Never Smoker     Smokeless tobacco: Never Used   Substance and Sexual Activity     Alcohol use: Yes     Alcohol/week: 1.0 standard drinks     Types: 1 Glasses of wine per week     Drug use: No     Sexual activity: Not Currently   Lifestyle     Physical activity:     Days per week: None     Minutes per session: None     Stress: None   Relationships     Social connections:     Talks on phone: None     Gets together: None     Attends Latter day service: None     Active member of club or organization: None     Attends meetings of clubs or organizations: None     Relationship status: None     Intimate partner violence:     Fear of current or ex partner: None     Emotionally abused: None     Physically abused: None     Forced sexual activity: None   Other Topics Concern     None   Social History Narrative     None   .         Review of Systems:   Review of Systems  GEN: never feels good. Somewhat tired  GI: as per HPI. Always feels alittle uncomfortable and nauseated  : See HPI  PMH: History of bulimia, resolved 2012           Physical Exam:     Vitals:    12/31/19 1601   BP: 118/82   BP Location: Left arm   Patient Position: Chair   Cuff Size: Adult Regular   Pulse: 91   Resp: 16   Temp: 98  F (36.7  C)   TempSrc: Oral   SpO2: 100%   Weight: 64.9 kg (143 lb)   Height: 1.796 m (5' 10.7\")     Body mass index is 20.11 kg/m .  Vitals were reviewed and were normal     Physical Exam  GEN: alert female in no acute distress  GI: Abdomen flat, soft with BSx4. No focal tenderness or masses. Reports general discomfort to palpation.   : Wet prep negative.   SKIN: clear without rashes  PMH:  Becomes weepy discussing GI health      Results:      Results from this visit  Results for orders placed or performed in visit on 12/31/19   Urinalysis, Micro If ([facility])     Status: None   Result " Value Ref Range    Color Urine Yellow Yellow    Clarity, urine Clear Clear    Leukocyte Esterase UR Negative Negative    Nitrite Urine Negative Negative mg/dL    Protein UR Negative Negative mg/dL    Glucose Urine Negative Negative    Ketones Urine Negative Negative mg/dL    Urobilinogen mg/dL 0.2 E.U./dL 0.2 E.U./dL    Bilirubin UR Negative Negative mg/dL    Blood UR Negative Negative mg/dL    Specific Gravity Urine 1.0 1.0 - 1.0    pH Urine 7.0 4.5 - 8.0   Wet Prep ([facility])     Status: None   Result Value Ref Range    Yeast Wet Prep None none    Motile Trichomonas Wet Prep Negative Negative    Clue Cells Wet Prep None NONE       Assessment and Plan        1. Dysuria  UA WNL, UC not sent.   - Urinalysis, Micro If ([facility])    2. Vaginal discharge  No evidence for vaginitis  - Wet Prep ([facility])    3. Abdominal pain, generalized  Secondary to irritable bowel type symptoms  Discussed functional nutrition referral and given list: [facility] and Dr. Kyle Renae. She will need to check with insurance.   Also discussed recent research regarding leakage of good bacteria into duodenum which can cause some of these symptoms. Can do breath test, but discussed with patient would need to do more reading and determine who is doing that locally.  She is in agreement with this  Also discussed Reiki option here.          There are no discontinued medications.    Options for treatment and follow-up care were reviewed with the patient. Araceli Sellers  engaged in the decision making process and verbalized understanding of the options discussed and agreed with the final plan.    Vanesa Iniguez, MAYRA CNP

## 2019-12-31 NOTE — NURSING NOTE
"24 year old  Chief Complaint   Patient presents with     Urinary Problem     Pt. presents to the clinic today with itchy vagina, frequent urination. X 2 days       Blood pressure 118/82, pulse 91, temperature 98  F (36.7  C), temperature source Oral, resp. rate 16, height 1.796 m (5' 10.7\"), weight 64.9 kg (143 lb), SpO2 100 %, not currently breastfeeding. Body mass index is 20.11 kg/m .  BP completed using cuff size:    Patient Active Problem List   Diagnosis     Mirena IUD placed 6/4/18       Wt Readings from Last 2 Encounters:   12/31/19 64.9 kg (143 lb)   06/06/19 65.9 kg (145 lb 3.2 oz)     BP Readings from Last 3 Encounters:   12/31/19 118/82   06/06/19 109/74   06/18/18 105/73       Allergies   Allergen Reactions     Fluoxetine      Other reaction(s): Unknown  Patient reports \"heart hurts\" and racing, throat feels like it is closing about 10 min after taking med.  Took 3 doses, symptoms worse each dose.     Seasonal Allergies      Sertraline Nausea     Other reaction(s): GI Upset       Current Outpatient Medications   Medication     Cholecalciferol (VITAMIN D PO)     MELATONIN     venlafaxine (EFFEXOR) 75 MG tablet     levonorgestrel (MIRENA, 52 MG,) 20 MCG/24HR IUD     Lubiprostone 8 MCG CAPS capsule     mometasone (NASONEX) 50 MCG/ACT nasal spray     No current facility-administered medications for this visit.        Social History     Tobacco Use     Smoking status: Never Smoker     Smokeless tobacco: Never Used   Substance Use Topics     Alcohol use: Yes     Alcohol/week: 1.0 standard drinks     Types: 1 Glasses of wine per week     Drug use: No       Honoring Choices - Health Care Directive Guide offered to patient at time of visit.    Health Maintenance Due   Topic Date Due     HPV IMMUNIZATION (1 - Female 2-dose series) 06/17/2004     DTAP/TDAP/TD IMMUNIZATION (1 - Tdap) 06/17/2004     PHQ-2  01/01/2019     INFLUENZA VACCINE (1) 09/01/2019         There is no immunization history on file for this " patient.    Lab Results   Component Value Date    PAP NIL 06/06/2019         Recent Labs   Lab Test 06/06/19  1450 06/18/18  1024 05/31/17  1341   ALT  --  22  --    CR 0.77 0.75  --    GFRESTIMATED >90 >90  --    GFRESTBLACK >90 >90  --    ALBUMIN  --  4.2  --    POTASSIUM 4.0 3.8  --    TSH 0.82  --  0.57       PHQ-2 ( 1999 Pfizer) 12/31/2019 6/1/2018   Q1: Little interest or pleasure in doing things 0 0   Q2: Feeling down, depressed or hopeless 0 0   PHQ-2 Score 0 0   Q1: Little interest or pleasure in doing things - Not at all   Q2: Feeling down, depressed or hopeless - Not at all   PHQ-2 Score - 0       PHQ-9 SCORE 12/31/2019   PHQ-9 Total Score 3       CHRISTELLE-7 SCORE 6/1/2018 12/31/2019   Total Score 1 (minimal anxiety) -   Total Score 1 3       No flowsheet data found.    Jordyn Fulton CMA  December 31, 2019 4:02 PM

## 2019-12-31 NOTE — PATIENT INSTRUCTIONS
Urinary frequency and vaginal discharge  Long term GI upset with alternating constipation and diarrhea and symptoms of irritable bowel syndrome  UA within normal limits  Wet prep negative  Will explore therapies for recalcitrant irritable bowel and get back to you  Functional nutrition and integrative health providers:  [facility]  [address] [place] [address] [phone]  Dr. Kyle Reane [place]  [address] [facility] [address], [place] [address] [phone]

## 2020-01-01 ASSESSMENT — ANXIETY QUESTIONNAIRES: GAD7 TOTAL SCORE: 3

## 2020-01-09 DIAGNOSIS — K58.2 IRRITABLE BOWEL SYNDROME WITH BOTH CONSTIPATION AND DIARRHEA: Primary | ICD-10-CM

## 2020-01-09 NOTE — TELEPHONE ENCOUNTER
RECORDS RECEIVED FROM: [facility]-  MAYRA Reina CNP   DATE RECEIVED: 4/2/2020   NOTES STATUS DETAILS   OFFICE NOTE from referring provider Internal 1/9/2020 Referral   12/31/19 Office visit with MAYRA Reina CNP    OFFICE NOTE from other specialist Received/ Care Everywhere 4/25/18 Office visit with Dr. Jany Goff ([facility])    4/4/17 Office visit with Shagufta Johnson CNP ([facility])   DISCHARGE SUMMARY from hospital N/A    OPERATIVE REPORT N/A    MEDICATION LIST Internal/ Care Everywhere         ENDOSCOPY  N/A    COLONOSCOPY Received 6/29/17 ([facility])    ERCP N/A    EUS N/A    STOOL TESTING Internal 6/18/18, 9/12/17   PERTINENT LABS Internal    PATHOLOGY REPORTS (RELATED) Internal 6/29/17   IMAGING (CT, MRI, EGD) N/A      REFERRAL INFORMATION    Date referral was placed: 4/2/2020   Date all records received: N/A   Date records were scanned into Epic: N/A   Date records were sent to Provider to review: N/A   Date and recommendation received from provider:  LETTER SENT  SCHEDULE APPOINTMENT   Date patient was contacted to schedule: 1/9/2020

## 2020-01-14 DIAGNOSIS — K58.2 IRRITABLE BOWEL SYNDROME WITH BOTH CONSTIPATION AND DIARRHEA: Primary | ICD-10-CM

## 2020-01-21 ENCOUNTER — TELEPHONE (OUTPATIENT)
Dept: FAMILY MEDICINE | Facility: CLINIC | Age: 25
End: 2020-01-21

## 2020-01-21 NOTE — TELEPHONE ENCOUNTER
TC to patient re: [facility] appointment and lactulose breath test.  She had an appointment yesterday and the lactulose breath test was done. She will receive results in 9-10 days.  She was also given samples of meds for constipation. GI will treat with antibiotics if positive breath test. Vanesa NUNEZ CNP

## 2020-03-03 ENCOUNTER — OFFICE VISIT (OUTPATIENT)
Dept: FAMILY MEDICINE | Facility: CLINIC | Age: 25
End: 2020-03-03
Payer: COMMERCIAL

## 2020-03-03 VITALS
RESPIRATION RATE: 16 BRPM | HEART RATE: 97 BPM | TEMPERATURE: 98.3 F | WEIGHT: 144 LBS | OXYGEN SATURATION: 98 % | DIASTOLIC BLOOD PRESSURE: 77 MMHG | HEIGHT: 71 IN | BODY MASS INDEX: 20.16 KG/M2 | SYSTOLIC BLOOD PRESSURE: 117 MMHG

## 2020-03-03 DIAGNOSIS — J30.2 SEASONAL ALLERGIC RHINITIS, UNSPECIFIED TRIGGER: Primary | ICD-10-CM

## 2020-03-03 RX ORDER — FLUTICASONE PROPIONATE 50 MCG
1-2 SPRAY, SUSPENSION (ML) NASAL DAILY
Qty: 9.9 ML | Refills: 3 | Status: SHIPPED | OUTPATIENT
Start: 2020-03-03 | End: 2024-02-06

## 2020-03-03 RX ORDER — LORATADINE 10 MG/1
10 TABLET ORAL DAILY
Qty: 90 TABLET | Refills: 1 | Status: SHIPPED | OUTPATIENT
Start: 2020-03-03 | End: 2024-02-06

## 2020-03-03 ASSESSMENT — MIFFLIN-ST. JEOR: SCORE: 1490.89

## 2020-03-03 NOTE — PATIENT INSTRUCTIONS
Acute bronchitis  - Comfort cares: warm tea, honey and lemon  - Can try Flonase, loratadine, neti pot  - Residual cough can last 6-8 weeks before getting better  - If begin to have fevers, chills, worsening symptoms please come back or call     Patient Education     What Is Acute Bronchitis?  Acute bronchitis is when the airways in your lungs (bronchial tubes) becomered and swollen (inflamed). It is usually caused by a viral infection. But it can also occur because of a bacteria or allergen. Symptoms include a cough that produces yellow or greenish mucus and can last for days or sometimes weeks.  Lungs with bronchitis  Bronchitis often occurs with a cold or the flu virus. The airways become inflamed (red and swollen). There is a deep hacking cough from the extra mucus. Other symptoms may include:    Wheezing    Chest discomfort    Shortness of breath    Mild fever  A second infection, this time due to bacteria, may then occur. And airways irritated by allergens or smoke are more likely to get infected.  Making a diagnosis  A physical exam, health history, and certain tests help your healthcare provider make the diagnosis.  Health history  Your healthcare provider will ask you about previous illnesses and your current symptoms. You will also be asked about what medications you currently take, including your use of over-the-counter drugs, vitamins, herbs, and supplements.  The exam  Your provider listens to your chest for signs of congestion. He or she may also check your ears, nose, and throat.  Possible tests    A sputum test for bacteria. This requires a sample of mucus from your lungs.    A nasal or throat swab. This tests to see if you have a bacterial or viral infection.    A chest X-ray. This is done if your healthcare provider thinks you have pneumonia.    Tests to check for an underlying condition. Other tests may be done to check for things such as allergies, asthma, or COPD (chronic obstructive pulmonary  disease). You may need to see a specialist for more lung function testing.    Treating a cough  The main treatment for bronchitis is easing symptoms. Avoiding smoke, allergens, and other things that trigger coughing can often help. If the infection is bacterial, you may be given antibiotics. During the illness, it's important to get plenty of sleep. To ease symptoms:    Don t smoke. Also avoid secondhand smoke.    Use a humidifier. Or try breathing in steam from a hot shower. This may help loosen mucus.    Drink a lot of water and juice. They can soothe the throat and may help thin mucus.    Sit up or use extra pillows when in bed. This helps to lessen coughing and congestion.    Ask your provider about using medicine. Ask about using cough medicine, pain and fever medicine, or a decongestant.  Antibiotics  Most cases of bronchitis are caused by cold or flu viruses. They don t need antibiotics to treat them, even if your mucus is thick and green or yellow. Antibiotics don t treat viral illness and antibiotics have not been shown to have any benefit in cases of acute bronchitis. Taking antibiotics when they are not needed increases your risk of getting an infection later that is antibiotic-resistant. Antibiotics can also cause severe cases of diarrhea that require other antibiotics to treat.  It is important that you accept your healthcare provider's opinion to not use antibiotics. Your provider will prescribe antibiotics if the infection is caused by bacteria. If they are prescribed:    Take all of the medicine. Take the medicine until it is used up, even if symptoms have improved. If you don t, the bronchitis may come back.    Take the medicines as directed. For instance, some medicines should be taken with food.    Ask about side effects. Ask your provider or pharmacist what side effects are common, and what to do about them.  Follow-up care  You should see your provider again in 2 to 3 weeks. By this time,  symptoms should have improved. An infection that lasts longer may mean you have a more serious problem.  Prevention    Avoid tobacco smoke. If you smoke, quit. Stay away from smoky places. Ask friends and family not to smoke around you, or in your home or car.    Get checked for allergies.    Ask your provider about getting a yearly flu shot. Also ask about pneumococcal or pneumonia shots.    Wash your hands often. This helps reduce the chance of picking up viruses that cause colds and flu.    Contact your healthcare provider immediately if:    Symptoms worsen, or you have new symptoms    Breathing problems worsen    Symptoms don t get better within a week, or within 3 days of taking antibioticsCall 911 if you are:    wheezing    feeling lightheaded or dizzy    unable to talk    skin or nails looks blue or pale    Inside healthy lungs    Air travels in and out of the lungs through the airways. The linings of these airways produce sticky mucus. This mucus traps particles that enter the lungs. Tiny structures called cilia then sweep the particles out of the airways.     Healthy airway: Airways are normally open. Air moves in and out easily.      Healthy cilia: Tiny, hairlike cilia sweep mucus and particles up and out of the airways.        Inflamed airway: Inflammation and extra mucus narrow the airway, causing shortness of breath.      Impaired cilia: Extra mucus impairs cilia, causing congestion and wheezing. Smoking makes the problem worse.     Date Last Reviewed: 2/1/2017 2000-2019 [employer]. [address] [place] [address], [place] [address]. All rights reserved. This information is not intended as a substitute for professional medical care. Always follow your healthcare professional's instructions.

## 2020-03-03 NOTE — NURSING NOTE
"24 year old  Chief Complaint   Patient presents with     Cough     Pt. presents to the clinic today with a cough, congestion X 10 days. Works in a hospital        Blood pressure 117/77, pulse 97, temperature 98.3  F (36.8  C), temperature source Oral, resp. rate 16, height 1.806 m (5' 11.1\"), weight 65.3 kg (144 lb), SpO2 98 %, not currently breastfeeding. Body mass index is 20.03 kg/m .  BP completed using cuff size:    Patient Active Problem List   Diagnosis     Mirena IUD placed 6/4/18     Irritable bowel syndrome with both constipation and diarrhea       Wt Readings from Last 2 Encounters:   03/03/20 65.3 kg (144 lb)   12/31/19 64.9 kg (143 lb)     BP Readings from Last 3 Encounters:   03/03/20 117/77   12/31/19 118/82   06/06/19 109/74       Allergies   Allergen Reactions     Fluoxetine      Other reaction(s): Unknown  Patient reports \"heart hurts\" and racing, throat feels like it is closing about 10 min after taking med.  Took 3 doses, symptoms worse each dose.     Seasonal Allergies      Sertraline Nausea     Other reaction(s): GI Upset       Current Outpatient Medications   Medication     Cholecalciferol (VITAMIN D PO)     MELATONIN     mometasone (NASONEX) 50 MCG/ACT nasal spray     venlafaxine (EFFEXOR) 75 MG tablet     levonorgestrel (MIRENA, 52 MG,) 20 MCG/24HR IUD     Lubiprostone 8 MCG CAPS capsule     No current facility-administered medications for this visit.        Social History     Tobacco Use     Smoking status: Never Smoker     Smokeless tobacco: Never Used   Substance Use Topics     Alcohol use: Yes     Alcohol/week: 1.0 standard drinks     Types: 1 Glasses of wine per week     Drug use: No       Honoring Choices - Health Care Directive Guide offered to patient at time of visit.    Health Maintenance Due   Topic Date Due     HPV IMMUNIZATION (1 - Female 2-dose series) 06/17/2004     DTAP/TDAP/TD IMMUNIZATION (1 - Tdap) 06/17/2004     PHQ-2  01/01/2020         There is no immunization history " on file for this patient.    Lab Results   Component Value Date    PAP NIL 06/06/2019         Recent Labs   Lab Test 06/06/19  1450 06/18/18  1024 05/31/17  1341   ALT  --  22  --    CR 0.77 0.75  --    GFRESTIMATED >90 >90  --    GFRESTBLACK >90 >90  --    ALBUMIN  --  4.2  --    POTASSIUM 4.0 3.8  --    TSH 0.82  --  0.57       PHQ-2 ( 1999 Pfizer) 12/31/2019 6/1/2018   Q1: Little interest or pleasure in doing things 0 0   Q2: Feeling down, depressed or hopeless 0 0   PHQ-2 Score 0 0   Q1: Little interest or pleasure in doing things - Not at all   Q2: Feeling down, depressed or hopeless - Not at all   PHQ-2 Score - 0       PHQ-9 SCORE 12/31/2019   PHQ-9 Total Score 3       CHRISTELLE-7 SCORE 6/1/2018 12/31/2019   Total Score 1 (minimal anxiety) -   Total Score 1 3       No flowsheet data found.    Jordyn Fulton CMA  March 3, 2020 9:16 AM

## 2020-03-03 NOTE — PROGRESS NOTES
HPI       Araceli Sellers is a 24 year old  who presents for   Chief Complaint   Patient presents with     Cough     Pt. presents to the clinic today with a cough, congestion X 10 days. Works in a hospital      24 year old female with a history of season allergies, depression, and constipation. Patient presents today with a 10 day history of cough. At the onset of symptoms, patient experienced headache, chills, nausea, frontal sinus pressure and fatigue. Symptoms have since resolved except for the cough and frontal sinus pressure. 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 Patient has not had any known sick contacts or those who have had recent travel. In the last 2 days patient's cough has become productive with yellow/green mucous. Patient has also been experiencing postnasal drip. Patient has tried Mucinex DM, which has not provided any relief of symptoms.     Problem, Medication and Allergy Lists were      Patient Active Problem List    Diagnosis Date Noted     Irritable bowel syndrome with both constipation and diarrhea 12/31/2019     Priority: Medium     Mirena IUD placed 6/4/18 06/04/2018     Priority: Medium         Current Outpatient Medications   Medication Sig Dispense Refill     Cholecalciferol (VITAMIN D PO) Take 1 tablet by mouth daily       fluticasone (FLONASE) 50 MCG/ACT nasal spray Spray 1-2 sprays into both nostrils daily 9.9 mL 3     loratadine (CLARITIN) 10 MG tablet Take 1 tablet (10 mg) by mouth daily 90 tablet 1     MELATONIN 1 tablet as needed        mometasone (NASONEX) 50 MCG/ACT nasal spray Spray 2 sprays into both nostrils daily       venlafaxine (EFFEXOR) 75 MG tablet Take 75 mg by mouth daily        levonorgestrel (MIRENA, 52 MG,) 20 MCG/24HR IUD 1 each (20 mcg) by Intrauterine route once for 1 dose 1 each 0     Lubiprostone 8 MCG CAPS capsule Take 1 capsule (8 mcg) by mouth 2 times daily (Patient not taking: Reported on 6/6/2019) 60 capsule 3  "        Allergies   Allergen Reactions     Fluoxetine      Other reaction(s): Unknown  Patient reports \"heart hurts\" and racing, throat feels like it is closing about 10 min after taking med.  Took 3 doses, symptoms worse each dose.     Seasonal Allergies      Sertraline Nausea     Other reaction(s): GI Upset   .    Patient is   an established patient of this clinic.  Past Medical History:   Diagnosis Date     Bulimia 2012    Resolved     Constipation 2017     Depression 2010     Seasonal allergies      Family History   Problem Relation Age of Onset     Diabetes Maternal Grandfather      Hypertension Maternal Grandfather      Cardiovascular Maternal Grandfather      Social History     Socioeconomic History     Marital status: Single     Spouse name: Not on file     Number of children: Not on file     Years of education: Not on file     Highest education level: Not on file   Occupational History     Not on file   Social Needs     Financial resource strain: Not on file     Food insecurity:     Worry: Not on file     Inability: Not on file     Transportation needs:     Medical: Not on file     Non-medical: Not on file   Tobacco Use     Smoking status: Never Smoker     Smokeless tobacco: Never Used   Substance and Sexual Activity     Alcohol use: Yes     Alcohol/week: 1.0 standard drinks     Types: 1 Glasses of wine per week     Drug use: No     Sexual activity: Not Currently   Lifestyle     Physical activity:     Days per week: Not on file     Minutes per session: Not on file     Stress: Not on file   Relationships     Social connections:     Talks on phone: Not on file     Gets together: Not on file     Attends [employer] service: Not on file     Active member of club or organization: Not on file     Attends meetings of clubs or organizations: Not on file     Relationship status: Not on file     Intimate partner violence:     Fear of current or ex partner: Not on file     Emotionally abused: Not on file     Physically " "abused: Not on file     Forced sexual activity: Not on file   Other Topics Concern     Not on file   Social History Narrative     Not on file   .         Review of Systems:   Review of Systems  GENERAL: Denies fever and chills currently. Denies fatigue. Denies weight loss  HEENT: Nose: post nasal drip. Sinuses: frontal sinus pressureThroat: denies sore throat. Has had some allergy symptoms in past.   LUNGS: Wheezing occasionally by report  CV: Denies chest pain or palpitations  GI: Denies nausea, vomiting. Of note, did see GI for methane test and symptoms of IBS. Test was positive and received specific antibiotics to treat gut. She is feeling much better.          Physical Exam:     Vitals:    03/03/20 0916   BP: 117/77   Pulse: 97   Resp: 16   Temp: 98.3  F (36.8  C)   TempSrc: Oral   SpO2: 98%   Weight: 65.3 kg (144 lb)   Height: 1.806 m (5' 11.1\")     Body mass index is 20.03 kg/m .  Vitals were reviewed and were normal     Physical Exam  GENERAL: Oriented X3  HEAD: Negative for maxillary sinus pressure. Positive for frontal sinus pressure. Negative for enlarged lymph nodes  EYES: clear  EARS: TM pearly grey. Bony landmarks noted  NOSE: Enlarged turbinates. Positive for white/yellow mucous.   THROAT: Tonsils +1. Negative for exudate or petechiae. Negative for tonsillar abscess. Scant negative for post nasal drainage  RESP: Lungs sounds clear bilaterally. Negative for wheezes  CV: S1, S2. Negative for palpitations, thrill, heaves, lifts    Results:   No testing ordered today    Assessment and Plan        1. Seasonal allergic rhinitis, unspecified trigger  Hx of season allergies.   - loratadine (CLARITIN) 10 MG tablet; Take 1 tablet (10 mg) by mouth daily  Dispense: 90 tablet; Refill: 1  - fluticasone (FLONASE) 50 MCG/ACT nasal spray; Spray 1-2 sprays into both nostrils daily  Dispense: 9.9 mL; Refill: 3    Acute bronchitis  ROS and PE indicative of acute bronchitis. Day 10 of symptoms and resolving her HPI. " Postnasal drip and cough likely due to beginning of allergies and residual cold. Try Flonase and Claritin. If begin to have fever, chills, worsening symptoms, come back. Consider sinusitis and antibiotic treatment if persistent or worsening symptoms.      There are no discontinued medications.  I was present with the Zoraida Sierra RN, N NP student who participated in the service and in the documentation of the services provided. I have verified the history and personally performed the physical exam and medical decision making, as documented by the student and edited by me    MAYRA Dowling CNP  03/03/20  3:21 PM      Options for treatment and follow-up care were reviewed with the patient. Araceli Sellers  engaged in the decision making process and verbalized understanding of the options discussed and agreed with the final plan.    MAYRA Dowling CNP

## 2020-04-02 ENCOUNTER — PRE VISIT (OUTPATIENT)
Dept: GASTROENTEROLOGY | Facility: CLINIC | Age: 25
End: 2020-04-02

## 2020-04-28 ENCOUNTER — HOSPITAL ENCOUNTER (EMERGENCY)
Facility: CLINIC | Age: 25
Discharge: HOME | End: 2020-04-28
Attending: INTERNAL MEDICINE | Admitting: INTERNAL MEDICINE
Payer: COMMERCIAL

## 2020-04-28 VITALS
DIASTOLIC BLOOD PRESSURE: 71 MMHG | RESPIRATION RATE: 14 BRPM | OXYGEN SATURATION: 100 % | TEMPERATURE: 98 F | SYSTOLIC BLOOD PRESSURE: 100 MMHG

## 2020-04-28 DIAGNOSIS — R55 VASOVAGAL SYNCOPE: ICD-10-CM

## 2020-04-28 LAB
ANION GAP SERPL CALCULATED.3IONS-SCNC: 4 MMOL/L (ref 3–14)
BASOPHILS # BLD AUTO: 0 10E9/L (ref 0–0.2)
BASOPHILS NFR BLD AUTO: 0.2 %
BUN SERPL-MCNC: 18 MG/DL (ref 7–30)
CALCIUM SERPL-MCNC: 8.7 MG/DL (ref 8.5–10.1)
CHLORIDE SERPL-SCNC: 108 MMOL/L (ref 94–109)
CO2 SERPL-SCNC: 27 MMOL/L (ref 20–32)
CREAT SERPL-MCNC: 0.79 MG/DL (ref 0.52–1.04)
CRP SERPL-MCNC: <2.9 MG/L (ref 0–8)
DIFFERENTIAL METHOD BLD: NORMAL
EOSINOPHIL # BLD AUTO: 0.1 10E9/L (ref 0–0.7)
EOSINOPHIL NFR BLD AUTO: 0.9 %
ERYTHROCYTE [DISTWIDTH] IN BLOOD BY AUTOMATED COUNT: 12.3 % (ref 10–15)
GFR SERPL CREATININE-BSD FRML MDRD: >90 ML/MIN/{1.73_M2}
GLUCOSE SERPL-MCNC: 89 MG/DL (ref 70–99)
HCT VFR BLD AUTO: 42 % (ref 35–47)
HGB BLD-MCNC: 13.8 G/DL (ref 11.7–15.7)
IMM GRANULOCYTES # BLD: 0 10E9/L (ref 0–0.4)
IMM GRANULOCYTES NFR BLD: 0.2 %
INR PPP: 1.01 (ref 0.86–1.14)
INTERPRETATION ECG - MUSE: NORMAL
LYMPHOCYTES # BLD AUTO: 2.6 10E9/L (ref 0.8–5.3)
LYMPHOCYTES NFR BLD AUTO: 44 %
MCH RBC QN AUTO: 31.9 PG (ref 26.5–33)
MCHC RBC AUTO-ENTMCNC: 32.9 G/DL (ref 31.5–36.5)
MCV RBC AUTO: 97 FL (ref 78–100)
MONOCYTES # BLD AUTO: 0.3 10E9/L (ref 0–1.3)
MONOCYTES NFR BLD AUTO: 4.6 %
NEUTROPHILS # BLD AUTO: 2.9 10E9/L (ref 1.6–8.3)
NEUTROPHILS NFR BLD AUTO: 50.1 %
NRBC # BLD AUTO: 0 10*3/UL
NRBC BLD AUTO-RTO: 0 /100
PLATELET # BLD AUTO: 189 10E9/L (ref 150–450)
POTASSIUM SERPL-SCNC: 3.5 MMOL/L (ref 3.4–5.3)
RBC # BLD AUTO: 4.32 10E12/L (ref 3.8–5.2)
SODIUM SERPL-SCNC: 138 MMOL/L (ref 133–144)
TROPONIN I SERPL-MCNC: <0.015 UG/L (ref 0–0.04)
WBC # BLD AUTO: 5.9 10E9/L (ref 4–11)

## 2020-04-28 PROCEDURE — 93005 ELECTROCARDIOGRAM TRACING: CPT | Performed by: INTERNAL MEDICINE

## 2020-04-28 PROCEDURE — 96360 HYDRATION IV INFUSION INIT: CPT | Performed by: INTERNAL MEDICINE

## 2020-04-28 PROCEDURE — 85610 PROTHROMBIN TIME: CPT | Performed by: INTERNAL MEDICINE

## 2020-04-28 PROCEDURE — 86140 C-REACTIVE PROTEIN: CPT | Performed by: INTERNAL MEDICINE

## 2020-04-28 PROCEDURE — 99284 EMERGENCY DEPT VISIT MOD MDM: CPT | Mod: 25 | Performed by: INTERNAL MEDICINE

## 2020-04-28 PROCEDURE — 80048 BASIC METABOLIC PNL TOTAL CA: CPT | Performed by: INTERNAL MEDICINE

## 2020-04-28 PROCEDURE — 84484 ASSAY OF TROPONIN QUANT: CPT | Performed by: INTERNAL MEDICINE

## 2020-04-28 PROCEDURE — 25800030 ZZH RX IP 258 OP 636: Performed by: INTERNAL MEDICINE

## 2020-04-28 PROCEDURE — 93010 ELECTROCARDIOGRAM REPORT: CPT | Mod: Z6 | Performed by: INTERNAL MEDICINE

## 2020-04-28 PROCEDURE — 85025 COMPLETE CBC W/AUTO DIFF WBC: CPT | Performed by: INTERNAL MEDICINE

## 2020-04-28 RX ORDER — SODIUM CHLORIDE 9 MG/ML
1000 INJECTION, SOLUTION INTRAVENOUS CONTINUOUS
Status: DISCONTINUED | OUTPATIENT
Start: 2020-04-28 | End: 2020-04-28

## 2020-04-28 RX ADMIN — SODIUM CHLORIDE 1000 ML: 9 INJECTION, SOLUTION INTRAVENOUS at 18:16

## 2020-04-28 ASSESSMENT — ENCOUNTER SYMPTOMS
ABDOMINAL PAIN: 0
CONFUSION: 0
PALPITATIONS: 0
WHEEZING: 0
HEADACHES: 0
SHORTNESS OF BREATH: 0
NECK PAIN: 0
NAUSEA: 1
WEAKNESS: 0
COUGH: 0
BACK PAIN: 0
LIGHT-HEADEDNESS: 0
VOMITING: 1
FEVER: 0
DYSURIA: 0
NUMBNESS: 0
CHILLS: 0
ADENOPATHY: 0

## 2020-04-28 NOTE — LETTER
[facility], [place], EMERGENCY DEPARTMENT  [address] [place] [address]  [phone]      2020    Araceli Sellers  [address] [place] [address]  [phone] (home)     : 1995      To Whom it may concern:    Araceli Sellers was seen in our Emergency Department today, 2020 for an injury that was reported to be work related.      For the next 1 days she should not work    The employee might be taking medication so that they cannot operate moving machinery or perform activities that require balancing or working above ground.    After returning to work the following restrictions apply:   no restrictions    Sincerely,    BETTE HENSLEY MD

## 2020-04-28 NOTE — ED PROVIDER NOTES
"ED Provider Note  [facility]      History     Chief Complaint   Patient presents with     Loss of Consciousness     HPI  Araceli Sellers is a 24 year old female who presents from work after a syncopal event. 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 She was standing in a patient's room, began to feel lightheaded and had graying of her vision, followed by syncope. The patient who observed her stated that she hit the back of her head on a counter as she fell. She currently has minimal headache. She has minimal pain in the back of her head. There is no lump and no laceration. She has no neck pain, back pain or other injuries. She has had occasional vasovagal fainting as a child, but not recently. She has been eating and drinking normally. She had no chest pain or palpitations prior to fainting. She has no chest pain now. She has no fever, chills, URI symptoms, cough, sputum, shortness of breath, nausea vomiting or abdominal pain. She has no leg pain or swelling. Menses have been normal. She denies other recent illness.    Past Medical History  Past Medical History:   Diagnosis Date     Bulimia 2012    Resolved     Constipation 2017     Depression 2010     Seasonal allergies      No past surgical history on file.  Cholecalciferol (VITAMIN D PO)  fluticasone (FLONASE) 50 MCG/ACT nasal spray  levonorgestrel (MIRENA, 52 MG,) 20 MCG/24HR IUD  loratadine (CLARITIN) 10 MG tablet  MELATONIN  venlafaxine (EFFEXOR) 75 MG tablet      Allergies   Allergen Reactions     Fluoxetine      Other reaction(s): Unknown  Patient reports \"heart hurts\" and racing, throat feels like it is closing about 10 min after taking med.  Took 3 doses, symptoms worse each dose.     Seasonal Allergies      Sertraline Nausea     Other reaction(s): GI Upset     Past medical history, past surgical history, medications, and allergies were reviewed with the patient. Additional pertinent items: None    Family History  Family " History   Problem Relation Age of Onset     Diabetes Maternal Grandfather      Hypertension Maternal Grandfather      Cardiovascular Maternal Grandfather      Family history was reviewed with the patient. Additional pertinent items: None    Social History  Social History     Tobacco Use     Smoking status: Never Smoker     Smokeless tobacco: Never Used   Substance Use Topics     Alcohol use: Yes     Alcohol/week: 1.0 standard drinks     Types: 1 Glasses of wine per week     Drug use: No      Social history was reviewed with the patient. Additional pertinent items: None    Review of Systems   Constitutional: Negative for chills and fever.   HENT: Negative for congestion.    Eyes: Negative for visual disturbance.   Respiratory: Negative for cough, shortness of breath and wheezing.    Cardiovascular: Negative for chest pain and palpitations.   Gastrointestinal: Positive for nausea and vomiting. Negative for abdominal pain.   Genitourinary: Negative for dysuria and menstrual problem.   Musculoskeletal: Negative for back pain and neck pain.   Neurological: Negative for weakness, light-headedness, numbness and headaches.   Hematological: Negative for adenopathy.   Psychiatric/Behavioral: Negative for confusion.     A complete review of systems was performed with pertinent positives and negatives noted in the HPI, and all other systems negative.    Physical Exam   BP: 100/71  Heart Rate: 76  Temp: 98  F (36.7  C)  Resp: 18  SpO2: 100 %  Physical Exam  Vitals signs and nursing note reviewed.   Constitutional:       Appearance: Normal appearance. She is not ill-appearing.   HENT:      Head: Normocephalic and atraumatic.      Comments: Minimal left occipital tenderness, no bleeding, no mass.     Right Ear: Tympanic membrane normal.      Left Ear: Tympanic membrane normal.      Nose: Nose normal.      Mouth/Throat:      Mouth: Mucous membranes are moist.   Eyes:      Extraocular Movements: Extraocular movements intact.       Pupils: Pupils are equal, round, and reactive to light.   Neck:      Musculoskeletal: Normal range of motion.   Cardiovascular:      Rate and Rhythm: Normal rate and regular rhythm.      Heart sounds: No murmur.   Pulmonary:      Effort: Pulmonary effort is normal.      Breath sounds: Normal breath sounds. No wheezing or rales.   Abdominal:      General: Abdomen is flat.      Palpations: Abdomen is soft.   Musculoskeletal:      Right lower leg: No edema.      Left lower leg: No edema.   Skin:     General: Skin is warm and dry.   Neurological:      General: No focal deficit present.      Mental Status: She is alert and oriented to person, place, and time.      Gait: Gait normal.   Psychiatric:         Mood and Affect: Mood normal.         Behavior: Behavior normal.         ED Course      Procedures             EKG Interpretation:      Interpreted by BETTE HENSLEY MD  Time reviewed: 1809  Symptoms at time of EKG: syncope   Rhythm: normal sinus   Rate: Normal  Axis: Normal  Ectopy: none  Conduction: normal  ST Segments/ T Waves: No ST-T wave changes and No acute ischemic changes  Q Waves: none  Comparison to prior: No old EKG available    Clinical Impression: normal EKG    Labs/Imaging    Results for orders placed or performed during the hospital encounter of 04/28/20 (from the past 24 hour(s))   EKG 12-lead, tracing only   Result Value Ref Range    Interpretation ECG Click View Image link to view waveform and result    CBC with platelets differential   Result Value Ref Range    WBC 5.9 4.0 - 11.0 10e9/L    RBC Count 4.32 3.8 - 5.2 10e12/L    Hemoglobin 13.8 11.7 - 15.7 g/dL    Hematocrit 42.0 35.0 - 47.0 %    MCV 97 78 - 100 fl    MCH 31.9 26.5 - 33.0 pg    MCHC 32.9 31.5 - 36.5 g/dL    RDW 12.3 10.0 - 15.0 %    Platelet Count 189 150 - 450 10e9/L    Diff Method Automated Method     % Neutrophils 50.1 %    % Lymphocytes 44.0 %    % Monocytes 4.6 %    % Eosinophils 0.9 %    % Basophils 0.2 %    % Immature  Granulocytes 0.2 %    Nucleated RBCs 0 0 /100    Absolute Neutrophil 2.9 1.6 - 8.3 10e9/L    Absolute Lymphocytes 2.6 0.8 - 5.3 10e9/L    Absolute Monocytes 0.3 0.0 - 1.3 10e9/L    Absolute Eosinophils 0.1 0.0 - 0.7 10e9/L    Absolute Basophils 0.0 0.0 - 0.2 10e9/L    Abs Immature Granulocytes 0.0 0 - 0.4 10e9/L    Absolute Nucleated RBC 0.0    INR   Result Value Ref Range    INR 1.01 0.86 - 1.14   Basic metabolic panel   Result Value Ref Range    Sodium 138 133 - 144 mmol/L    Potassium 3.5 3.4 - 5.3 mmol/L    Chloride 108 94 - 109 mmol/L    Carbon Dioxide 27 20 - 32 mmol/L    Anion Gap 4 3 - 14 mmol/L    Glucose 89 70 - 99 mg/dL    Urea Nitrogen 18 7 - 30 mg/dL    Creatinine 0.79 0.52 - 1.04 mg/dL    GFR Estimate >90 >60 mL/min/[1.73_m2]    GFR Estimate If Black >90 >60 mL/min/[1.73_m2]    Calcium 8.7 8.5 - 10.1 mg/dL   CRP inflammation   Result Value Ref Range    CRP Inflammation <2.9 0.0 - 8.0 mg/L   Troponin I   Result Value Ref Range    Troponin I ES <0.015 0.000 - 0.045 ug/L           Medications   0.9% sodium chloride BOLUS (1,000 mLs Intravenous New Bag 4/28/20 1816)        Assessments & Plan (with Medical Decision Making)   Impression:  Young female in generally good health presents with a brief syncopal event at work today while standing. She appears well. She has normal EKG. She has mild occipital pain where her head hit a counter. She has no significant headache, neck pain or other injuries evident from the fall. She is neurologically intact. I do not feel that head CT or other injury are indicated. She was given a liter of saline in the ED. Labs are unremarkable. The event is consistent with vasovagal syncope triggered by upright posture.     I have reviewed the nursing notes. I have reviewed the findings, diagnosis, plan and need for follow up with the patient.    New Prescriptions    No medications on file       Final diagnoses:   Vasovagal syncope       --  BETTE HENSLEY MD   Emergency Medicine    [facility], [place], EMERGENCY DEPARTMENT  4/28/2020     Miky Day MD  04/28/20 4690

## 2020-04-28 NOTE — ED TRIAGE NOTES
Justus presents from work after fainting and falling to the foor. 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 She states she had started to feel dizzy and moved to lean against a wall, when she then fainted and fell, hitting the back of her head. Denies chest pain, SOB or nausea. She states she fainted occasionally when she was a child. Alert and oriented. Reports eating and drinking normally today with good fluid intake. VSS in triage.

## 2020-04-28 NOTE — ED AVS SNAPSHOT
[facility], [place], Emergency Department  [address] [place] [address]  Phone:  [phone]                                    Araceli Sellers   MRN: [id number]    Department:  [facility], Emergency Department   Date of Visit:  4/28/2020           After Visit Summary Signature Page    I have received my discharge instructions, and my questions have been answered. I have discussed any challenges I see with this plan with the nurse or doctor.    ..........................................................................................................................................  Patient/Patient Representative Signature      ..........................................................................................................................................  Patient Representative Print Name and Relationship to Patient    ..................................................               ................................................  Date                                   Time    ..........................................................................................................................................  Reviewed by Signature/Title    ...................................................              ..............................................  Date                                               Time          22EPIC Rev 08/18

## 2020-04-29 NOTE — DISCHARGE INSTRUCTIONS
Return if you develop severe headache, nausea, vomiting, neck pain or any new or worsening symptoms.  Follow up with your primary doctor.  Rest tonight.  Drink plenty of fluids.  File incident report. Contact Employee health service.   You can return to work tomorrow with no restriction.

## 2020-05-06 ENCOUNTER — VIRTUAL VISIT (OUTPATIENT)
Dept: FAMILY MEDICINE | Facility: CLINIC | Age: 25
End: 2020-05-06
Payer: COMMERCIAL

## 2020-05-06 DIAGNOSIS — R55 SYNCOPE, UNSPECIFIED SYNCOPE TYPE: Primary | ICD-10-CM

## 2020-05-06 DIAGNOSIS — F32.1 MODERATE MAJOR DEPRESSION (H): ICD-10-CM

## 2020-05-06 ASSESSMENT — ENCOUNTER SYMPTOMS
ACTIVITY CHANGE: 0
FATIGUE: 0
LIGHT-HEADEDNESS: 0
DIZZINESS: 0

## 2020-05-06 NOTE — PATIENT INSTRUCTIONS

## 2020-05-06 NOTE — NURSING NOTE
"24 year old  Chief Complaint   Patient presents with     Follow Up     ED follow up after fainting at work a week ago. Needs a note to go back to work.        not currently breastfeeding. There is no height or weight on file to calculate BMI.  BP completed using cuff size:    Patient Active Problem List   Diagnosis     Mirena IUD placed 6/4/18     Irritable bowel syndrome with both constipation and diarrhea       Wt Readings from Last 2 Encounters:   03/03/20 65.3 kg (144 lb)   12/31/19 64.9 kg (143 lb)     BP Readings from Last 3 Encounters:   04/28/20 100/71   03/03/20 117/77   12/31/19 118/82       Allergies   Allergen Reactions     Fluoxetine      Other reaction(s): Unknown  Patient reports \"heart hurts\" and racing, throat feels like it is closing about 10 min after taking med.  Took 3 doses, symptoms worse each dose.     Seasonal Allergies      Sertraline Nausea     Other reaction(s): GI Upset       Current Outpatient Medications   Medication     Cholecalciferol (VITAMIN D PO)     fluticasone (FLONASE) 50 MCG/ACT nasal spray     lamoTRIgine (LAMICTAL PO)     loratadine (CLARITIN) 10 MG tablet     MELATONIN     levonorgestrel (MIRENA, 52 MG,) 20 MCG/24HR IUD     venlafaxine (EFFEXOR) 75 MG tablet     No current facility-administered medications for this visit.        Social History     Tobacco Use     Smoking status: Never Smoker     Smokeless tobacco: Never Used   Substance Use Topics     Alcohol use: Yes     Alcohol/week: 1.0 standard drinks     Types: 1 Glasses of wine per week     Drug use: No       Honoring Choices - Health Care Directive Guide offered to patient at time of visit.    Health Maintenance Due   Topic Date Due     HPV IMMUNIZATION (1 - 2-dose series) 06/17/2004     DTAP/TDAP/TD IMMUNIZATION (1 - Tdap) 06/17/2018     PHQ-2  01/01/2020     PREVENTIVE CARE VISIT  06/06/2020         There is no immunization history on file for this patient.    Lab Results   Component Value Date    PAP NIL " 06/06/2019         Recent Labs   Lab Test 04/28/20  1817 06/06/19  1450 06/18/18  1024  05/31/17  1341   ALT  --   --  22  --   --    CR 0.79 0.77 0.75   < >  --    GFRESTIMATED >90 >90 >90   < >  --    GFRESTBLACK >90 >90 >90   < >  --    ALBUMIN  --   --  4.2  --   --    POTASSIUM 3.5 4.0 3.8   < >  --    TSH  --  0.82  --   --  0.57    < > = values in this interval not displayed.       PHQ-2 ( 1999 Pfizer) 12/31/2019 6/1/2018   Q1: Little interest or pleasure in doing things 0 0   Q2: Feeling down, depressed or hopeless 0 0   PHQ-2 Score 0 0   Q1: Little interest or pleasure in doing things - Not at all   Q2: Feeling down, depressed or hopeless - Not at all   PHQ-2 Score - 0       PHQ-9 SCORE 12/31/2019   PHQ-9 Total Score 3       CHRISTELLE-7 SCORE 6/1/2018 12/31/2019   Total Score 1 (minimal anxiety) -   Total Score 1 3       No flowsheet data found.        Jordyn Fulton, SANDRA  May 6, 2020 9:35 AM

## 2020-05-06 NOTE — LETTER
May 6, 2020      Araceli Sellers  [address] [place] [address]    To Whom It May Concern,    I performed a virtual visit with Araceli today for follow up after fainting at work on 04/28/2020. Upon review of past medical history, recent episode of fainting, and current medical status, I am recommending that Araceli return to work 05/07/2020 without restrictions. Thank you for your consideration.     Sincerely,     MAYRA Quintanilla, CNP

## 2020-05-06 NOTE — PROGRESS NOTES
"Araceli Sellers is a 24 year old female who is being evaluated via a billable video visit.      The patient has been notified of following:     \"This video visit will be conducted via a call between you and your physician/provider. We have found that certain health care needs can be provided without the need for an in-person physical exam.  This service lets us provide the care you need with a video conversation.  If a prescription is necessary we can send it directly to your pharmacy.  If lab work is needed we can place an order for that and you can then stop by our lab to have the test done at a later time.    Video visits are billed at different rates depending on your insurance coverage.  Please reach out to your insurance provider with any questions.    If during the course of the call the physician/provider feels a video visit is not appropriate, you will not be charged for this service.\"    Patient has given verbal consent for Video visit? Yes    How would you like to obtain your AVS? Pan    Patient would like the video invitation sent by: Send to e-mail at: [email or web address]    Will anyone else be joining your video visit? 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 She does have a history of vasovagal fainting as a chid, but not recently as an adult. She has been taking some supplementation to help with her depression. She is working with a psychiatrist on her depression. She has had some recent medication changes. Currently, she is feeling well.     HPI    Video Start Time: 1051    Reviewed and updated as needed this visit by Provider    Review of Systems   Constitutional: Negative for activity change and fatigue.   Neurological: Negative for dizziness and light-headedness.            Objective    There were no vitals taken for this " "visit.  Estimated body mass index is 20.03 kg/m  as calculated from the following:    Height as of 3/3/20: 1.806 m (5' 11.1\").    Weight as of 3/3/20: 65.3 kg (144 lb).  Physical Exam  Constitutional:       Appearance: Normal appearance.   HENT:      Head: Normocephalic.   Neurological:      General: No focal deficit present.      Mental Status: She is alert and oriented to person, place, and time.   Psychiatric:         Mood and Affect: Mood normal.         Behavior: Behavior normal.        Assessment & Plan       ICD-10-CM    1. Syncope, unspecified syncope type  R55    2. Moderate major depression (H)  F32.1         Return if symptoms worsen or fail to improve.    Video-Visit Details    Type of service:  Video Visit    Video End Time:1059    Originating Location (pt. Location): Home    Distant Location (provider location): [facility] clinic    Platform used for Video Visit: AmWell    Return if symptoms worsen or fail to improve.     Araceli is doing well, she has not had any further dizziness or light headedness. I feel that it is fine for her to return to work without restrictions. A letter is written to reflect this information. Araceli will return to our clinic as needed with worsening or persisting symptoms. She verbalizes understanding of the current plan.   MAYRA Quintanilla, CNP        "

## 2020-08-11 ENCOUNTER — ANCILLARY PROCEDURE (OUTPATIENT)
Dept: MAMMOGRAPHY | Facility: CLINIC | Age: 25
End: 2020-08-11
Payer: COMMERCIAL

## 2020-08-11 DIAGNOSIS — Z09 FOLLOW UP: ICD-10-CM

## 2020-08-11 DIAGNOSIS — N63.20 BREAST MASS, LEFT: ICD-10-CM

## 2020-08-30 ENCOUNTER — COMMUNICATION - HEALTHEAST (OUTPATIENT)
Dept: SCHEDULING | Facility: CLINIC | Age: 25
End: 2020-08-30

## 2020-08-31 ENCOUNTER — VIRTUAL VISIT (OUTPATIENT)
Dept: FAMILY MEDICINE | Facility: OTHER | Age: 25
End: 2020-08-31
Payer: COMMERCIAL

## 2020-08-31 PROCEDURE — 99421 OL DIG E/M SVC 5-10 MIN: CPT | Performed by: FAMILY MEDICINE

## 2020-09-01 NOTE — PROGRESS NOTES
"Date: 2020 11:28:26  Clinician: Miky Jacinto  Clinician NPI: [id number]  Patient: Araceli Sellers  Patient : 1995  Patient Address: [address] [place] [address]  Patient Phone: [phone]  Visit Protocol: URI  Patient Summary:  Araceli is a 25 year old ( : 1995 ) female who initiated a Visit for COVID-19 (Coronavirus) evaluation and screening. When asked the question \"Please sign me up to receive news, health information and promotions from [employer].\", Araceli responded \"No\".    Araceli states her symptoms started today.   Her symptoms consist of a headache, malaise, a sore throat, a cough, and nasal congestion.   Symptom details     Nasal secretions: The color of her mucus is clear.    Cough: Araceli coughs a few times an hour and her cough is not more bothersome at night. Phlegm does not come into her throat when she coughs. She does not believe her cough is caused by post-nasal drip.     Sore throat: Araceli reports having mild throat pain (1-3 on a 10 point pain scale), does not have exudate on her tonsils, and can swallow liquids. The lymph nodes in her neck are not enlarged. A rash has not appeared on the skin since the sore throat started.     Headache: She states the headache is moderate (4-6 on a 10 point pain scale).      Araceli denies having ear pain, chills, enlarged lymph nodes, fever, wheezing, teeth pain, ageusia, diarrhea, vomiting, rhinitis, nausea, myalgias, anosmia, and facial pain or pressure. She also denies having recent facial or sinus surgery in the past 60 days and taking antibiotic medication in the past month. She is not experiencing dyspnea.   Precipitating events  Within the past week, Araceli has not been exposed to someone with strep throat. She has not recently been exposed to someone with influenza. Araceli has been in close contact with the following high risk individuals: immunocompromised people.   Pertinent COVID-19 (Coronavirus) information  In " the past 14 days, Araceli has not worked in a congregate living setting.   She either works or volunteers as a healthcare worker or a , or works or volunteers in a healthcare facility. She provides direct patient care. 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 She lives with a healthcare worker.   Araceli has not had a close contact with a laboratory-confirmed COVID-19 patient within 14 days of symptom onset.   Since December 2019, Araceli and has not had upper respiratory infection or influenza-like illness. Has not been diagnosed with lab-confirmed COVID-19 test   Pertinent medical history  Araceli does not get yeast infections when she takes antibiotics.   Araceli does not need a return to work/school note.   Weight: 150 lbs   Araceli does not smoke or use smokeless tobacco.   She denies pregnancy and denies breastfeeding. She does not menstruate.   Weight: 150 lbs    MEDICATIONS: Lamictal oral, Effexor XR oral, ALLERGIES: NKDA  Clinician Response:  Dear Araceli,   Your symptoms show that you may have coronavirus (COVID-19). This illness can cause fever, cough and trouble breathing. Many people get a mild case and get better on their own. Some people can get very sick.  What should I do?  We would like to test you for this virus.   1. Please call [phone] to schedule your visit. Explain that you were referred by [facility] to have a COVID-19 test. Be ready to share your [facility] visit ID number.  The following will serve as your written order for this COVID Test, ordered by me, for the indication of suspected COVID [Z20.828]: The test will be ordered in [employer], our electronic health record, after you are scheduled. It will show as ordered and authorized by Nathaniel Stephens MD.  Order: COVID-19 (Coronavirus) PCR for SYMPTOMATIC testing from [facility].       "2. When it's time for your COVID test:  Stay at least 6 feet away from others. (If someone will drive you to your test, stay in the backseat, as far away from the  as you can.)   Cover your mouth and nose with a mask, tissue or washcloth.  Go straight to the testing site. Don't make any stops on the way there or back.      3.Starting now: Stay home and away from others (self-isolate) until:   You've had no fever---and no medicine that reduces fever---for one full day (24 hours). And...   Your other symptoms have gotten better. For example, your cough or breathing has improved. And...   At least 10 days have passed since your symptoms started.       During this time, don't leave the house except for testing or medical care.   Stay in your own room, even for meals. Use your own bathroom if you can.   Stay away from others in your home. No hugging, kissing or shaking hands. No visitors.  Don't go to work, school or anywhere else.    Clean \"high touch\" surfaces often (doorknobs, counters, handles, etc.). Use a household cleaning spray or wipes. You'll find a full list of  on the EPA website: www.epa.gov/pesticide-registration/list-n-disinfectants-use-against-sars-cov-2.   Cover your mouth and nose with a mask, tissue or washcloth to avoid spreading germs.  Wash your hands and face often. Use soap and water.  Caregivers in these groups are at risk for severe illness due to COVID-19:  o People 65 years and older  o People who live in a nursing home or long-term care facility  o People with chronic disease (lung, heart, cancer, diabetes, kidney, liver, immunologic)  o People who have a weakened immune system, including those who:   Are in cancer treatment  Take medicine that weakens the immune system, such as corticosteroids  Had a bone marrow or organ transplant  Have an immune deficiency  Have poorly controlled HIV or AIDS  Are obese (body mass index of 40 or higher)  Smoke regularly   o Caregivers should " wear gloves while washing dishes, handling laundry and cleaning bedrooms and bathrooms.  o Use caution when washing and drying laundry: Don't shake dirty laundry, and use the warmest water setting that you can.  o For more tips, go to www.cdc.gov/coronavirus/2019-ncov/downloads/10Things.pdf.    4.Sign up for [employer]. We know it's scary to hear that you might have COVID-19. We want to track your symptoms to make sure you're okay over the next 2 weeks. Please look for an email from [employer]---this is a free, online program that we'll use to keep in touch. To sign up, follow the link in the email. Learn more at [email or web address]  How can I take care of myself?   Get lots of rest. Drink extra fluids (unless a doctor has told you not to).   Take Tylenol (acetaminophen) for fever or pain. If you have liver or kidney problems, ask your family doctor if it's okay to take Tylenol.   Adults can take either:    650 mg (two 325 mg pills) every 4 to 6 hours, or...   1,000 mg (two 500 mg pills) every 8 hours as needed.    Note: Don't take more than 3,000 mg in one day. Acetaminophen is found in many medicines (both prescribed and over-the-counter medicines). Read all labels to be sure you don't take too much.   For children, check the Tylenol bottle for the right dose. The dose is based on the child's age or weight.    If you have other health problems (like cancer, heart failure, an organ transplant or severe kidney disease): Call your specialty clinic if you don't feel better in the next 2 days.       Know when to call 911. Emergency warning signs include:    Trouble breathing or shortness of breath Pain or pressure in the chest that doesn't go away Feeling confused like you haven't felt before, or not being able to wake up Bluish-colored lips or face.  Where can I get more information?    [facility] -- About COVID-19: [email or web address]   CDC -- What to Do If You're Sick:  www.cdc.gov/coronavirus/2019-ncov/about/steps-when-sick.html   CDC -- Ending Home Isolation: www.cdc.gov/coronavirus/2019-ncov/hcp/disposition-in-home-patients.html   [facility] -- Caring for Someone: www.cdc.gov/coronavirus/2019-ncov/if-you-are-sick/care-for-someone.html   [facility] -- Interim Guidance for Hospital Discharge to Home: www.[facility].[place]./diseases/coronavirus/hcp/hospdischarge.pdf   [facility] clinical trials (COVID-19 research studies): clinicalaffairs.[facility]-clinical-trials    Below are the COVID-19 hotlines at the [place] [facility] ([facility]). Interpreters are available.    For health questions: Call 262-218-6361 or 1-425.449.6048 (7 a.m. to 7 p.m.) For questions about schools and childcare: Call 897-561-1293 or 1-646.318.5261 (7 a.m. to 7 p.m.)    Diagnosis: Other malaise  Diagnosis ICD: R53.81

## 2020-09-02 ENCOUNTER — APPOINTMENT (OUTPATIENT)
Dept: LAB | Facility: CLINIC | Age: 25
End: 2020-09-02
Payer: COMMERCIAL

## 2020-09-02 ENCOUNTER — RESULTS ONLY (OUTPATIENT)
Dept: LAB | Age: 25
End: 2020-09-02

## 2020-09-03 ENCOUNTER — NURSE TRIAGE (OUTPATIENT)
Dept: NURSING | Facility: CLINIC | Age: 25
End: 2020-09-03

## 2020-09-03 NOTE — TELEPHONE ENCOUNTER
Coronavirus (COVID-19) Notification     Reason for call  Patient requesting results     Lab Result    Lab test 2019-nCoV rRt-PCR in process        RN Recommendations/Instructions per [facility]  Continue quarantee and following instructions until you receive the results     Please Contact your PCP clinic or return to the Emergency department if your:    Symptoms worsen or other concerning symptom's.     Patient informed that if test for COVID19 is POSITIVE,  you will receive a call typically within 48 hours from the test date (date lab collected).  If NEGATIVE result, you will receive a letter in the mail or MyChart.        Leslie HERNANDEZ RN [facility]

## 2020-09-03 NOTE — TELEPHONE ENCOUNTER
Additional Information    Negative: RN needs further essential information from caller in order to complete triage    Negative: Requesting regular office appointment    Negative: [1] Caller requesting NON-URGENT health information AND [2] PCP's office is the best resource    [1] Follow-up call to recent contact AND [2] information only call, no triage required    Negative: Health Information question, no triage required and triager able to answer question    Negative: General information question, no triage required and triager able to answer question    Negative: Question about upcoming scheduled test, no triage required and triager able to answer question    Negative: [1] Caller is not with the adult (patient) AND [2] probable NON-URGENT symptoms    Protocols used: INFORMATION ONLY CALL-A-

## 2020-09-04 ENCOUNTER — NURSE TRIAGE (OUTPATIENT)
Dept: NURSING | Facility: CLINIC | Age: 25
End: 2020-09-04

## 2020-09-04 LAB
SARS-COV-2 RNA SPEC QL NAA+PROBE: ABNORMAL
SPECIMEN SOURCE: ABNORMAL

## 2020-09-04 NOTE — TELEPHONE ENCOUNTER
Pt is calling.    COVID test was done 50 hours ago. Calling for results.    Coronavirus (COVID-19) Notification     Reason for call  Patient requesting results     Lab Result    Lab test 2019-nCoV rRt-PCR in process        RN Recommendations/Instructions per [facility]  Continue quarantee and following instructions until you receive the results     Please Contact your PCP clinic or return to the Emergency department if your:    Symptoms worsen or other concerning symptom's.     Patient informed that if test for COVID19 is POSITIVE,  you will receive a call typically within 48 hours from the test date (date lab collected).  If NEGATIVE result, you will receive a letter in the mail or [facility].      Christina Patel RN  [facility] Triage Nurse Advisor  9/4/2020 at 1:05 PM

## 2020-10-20 ENCOUNTER — OFFICE VISIT (OUTPATIENT)
Dept: FAMILY MEDICINE | Facility: CLINIC | Age: 25
End: 2020-10-20
Payer: COMMERCIAL

## 2020-10-20 VITALS
DIASTOLIC BLOOD PRESSURE: 74 MMHG | HEIGHT: 72 IN | WEIGHT: 143.3 LBS | BODY MASS INDEX: 19.41 KG/M2 | OXYGEN SATURATION: 99 % | SYSTOLIC BLOOD PRESSURE: 111 MMHG | HEART RATE: 88 BPM | TEMPERATURE: 99.2 F

## 2020-10-20 DIAGNOSIS — Z13.220 LIPID SCREENING: ICD-10-CM

## 2020-10-20 DIAGNOSIS — N63.0 LUMP OR MASS IN BREAST: ICD-10-CM

## 2020-10-20 DIAGNOSIS — Z87.898 HISTORY OF SYNCOPE: ICD-10-CM

## 2020-10-20 DIAGNOSIS — R23.1 PALE SKIN: ICD-10-CM

## 2020-10-20 DIAGNOSIS — Z00.00 ROUTINE ADULT HEALTH MAINTENANCE: Primary | ICD-10-CM

## 2020-10-20 LAB
CHOLEST SERPL-MCNC: 140 MG/DL
ERYTHROCYTE [DISTWIDTH] IN BLOOD BY AUTOMATED COUNT: 13.2 % (ref 10–15)
FERRITIN SERPL-MCNC: 18 NG/ML (ref 12–150)
HCT VFR BLD AUTO: 41.5 % (ref 35–47)
HDLC SERPL-MCNC: 63 MG/DL
HGB BLD-MCNC: 13.7 G/DL (ref 11.7–15.7)
LDLC SERPL CALC-MCNC: 70 MG/DL
MCH RBC QN AUTO: 31.8 PG (ref 26.5–33)
MCHC RBC AUTO-ENTMCNC: 33 G/DL (ref 31.5–36.5)
MCV RBC AUTO: 96 FL (ref 78–100)
NONHDLC SERPL-MCNC: 77 MG/DL
PLATELET # BLD AUTO: 225 10E9/L (ref 150–450)
RBC # BLD AUTO: 4.31 10E12/L (ref 3.8–5.2)
TRIGL SERPL-MCNC: 35 MG/DL
WBC # BLD AUTO: 4 10E9/L (ref 4–11)

## 2020-10-20 RX ORDER — TRAZODONE HYDROCHLORIDE 50 MG/1
TABLET, FILM COATED ORAL
COMMUNITY
Start: 2020-10-08

## 2020-10-20 SDOH — ECONOMIC STABILITY: INCOME INSECURITY: HOW HARD IS IT FOR YOU TO PAY FOR THE VERY BASICS LIKE FOOD, HOUSING, MEDICAL CARE, AND HEATING?: NOT ASKED

## 2020-10-20 SDOH — ECONOMIC STABILITY: FOOD INSECURITY: WITHIN THE PAST 12 MONTHS, THE FOOD YOU BOUGHT JUST DIDN'T LAST AND YOU DIDN'T HAVE MONEY TO GET MORE.: NOT ASKED

## 2020-10-20 SDOH — ECONOMIC STABILITY: TRANSPORTATION INSECURITY
IN THE PAST 12 MONTHS, HAS LACK OF TRANSPORTATION KEPT YOU FROM MEETINGS, WORK, OR FROM GETTING THINGS NEEDED FOR DAILY LIVING?: NOT ASKED

## 2020-10-20 SDOH — ECONOMIC STABILITY: FOOD INSECURITY: WITHIN THE PAST 12 MONTHS, YOU WORRIED THAT YOUR FOOD WOULD RUN OUT BEFORE YOU GOT MONEY TO BUY MORE.: NOT ASKED

## 2020-10-20 SDOH — HEALTH STABILITY: MENTAL HEALTH: HOW OFTEN DO YOU HAVE 6 OR MORE DRINKS ON ONE OCCASION?: NOT ASKED

## 2020-10-20 SDOH — ECONOMIC STABILITY: TRANSPORTATION INSECURITY
IN THE PAST 12 MONTHS, HAS THE LACK OF TRANSPORTATION KEPT YOU FROM MEDICAL APPOINTMENTS OR FROM GETTING MEDICATIONS?: NOT ASKED

## 2020-10-20 SDOH — HEALTH STABILITY: MENTAL HEALTH: HOW MANY STANDARD DRINKS CONTAINING ALCOHOL DO YOU HAVE ON A TYPICAL DAY?: 1 OR 2

## 2020-10-20 SDOH — HEALTH STABILITY: MENTAL HEALTH: HOW OFTEN DO YOU HAVE A DRINK CONTAINING ALCOHOL?: 2-3 TIMES A WEEK

## 2020-10-20 ASSESSMENT — ANXIETY QUESTIONNAIRES
7. FEELING AFRAID AS IF SOMETHING AWFUL MIGHT HAPPEN: NOT AT ALL
2. NOT BEING ABLE TO STOP OR CONTROL WORRYING: SEVERAL DAYS
GAD7 TOTAL SCORE: 7
1. FEELING NERVOUS, ANXIOUS, OR ON EDGE: MORE THAN HALF THE DAYS
5. BEING SO RESTLESS THAT IT IS HARD TO SIT STILL: NOT AT ALL
3. WORRYING TOO MUCH ABOUT DIFFERENT THINGS: MORE THAN HALF THE DAYS
6. BECOMING EASILY ANNOYED OR IRRITABLE: NOT AT ALL
IF YOU CHECKED OFF ANY PROBLEMS ON THIS QUESTIONNAIRE, HOW DIFFICULT HAVE THESE PROBLEMS MADE IT FOR YOU TO DO YOUR WORK, TAKE CARE OF THINGS AT HOME, OR GET ALONG WITH OTHER PEOPLE: SOMEWHAT DIFFICULT

## 2020-10-20 ASSESSMENT — PATIENT HEALTH QUESTIONNAIRE - PHQ9
SUM OF ALL RESPONSES TO PHQ QUESTIONS 1-9: 5
5. POOR APPETITE OR OVEREATING: MORE THAN HALF THE DAYS

## 2020-10-20 ASSESSMENT — MIFFLIN-ST. JEOR: SCORE: 1489.06

## 2020-10-20 NOTE — PATIENT INSTRUCTIONS
Health Maintenance:  -CBC, Ferritin, and lipid screening today.  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     Syncope:  -Rise slowly from a seated or lying position to prevent symptoms of lightheadedness and prevent fainting. Encourage fluid intake to maintain adequate hydration.

## 2020-10-20 NOTE — NURSING NOTE
"25 year old  Chief Complaint   Patient presents with     Physical       Blood pressure 111/74, pulse 88, temperature 99.2  F (37.3  C), temperature source Oral, height 1.816 m (5' 11.5\"), weight 65 kg (143 lb 4.8 oz), SpO2 99 %, not currently breastfeeding. Body mass index is 19.71 kg/m .  BP completed using cuff size:    Patient Active Problem List   Diagnosis     Mirena IUD placed 6/4/18     Irritable bowel syndrome with both constipation and diarrhea       Wt Readings from Last 2 Encounters:   10/20/20 65 kg (143 lb 4.8 oz)   03/03/20 65.3 kg (144 lb)     BP Readings from Last 3 Encounters:   10/20/20 111/74   04/28/20 100/71   03/03/20 117/77       Allergies   Allergen Reactions     Fluoxetine      Other reaction(s): Unknown  Patient reports \"heart hurts\" and racing, throat feels like it is closing about 10 min after taking med.  Took 3 doses, symptoms worse each dose.     Seasonal Allergies      Sertraline Nausea     Other reaction(s): GI Upset       Current Outpatient Medications   Medication     Cholecalciferol (VITAMIN D PO)     levonorgestrel (MIRENA, 52 MG,) 20 MCG/24HR IUD     loratadine (CLARITIN) 10 MG tablet     MELATONIN     traZODone (DESYREL) 50 MG tablet     venlafaxine (EFFEXOR) 75 MG tablet     fluticasone (FLONASE) 50 MCG/ACT nasal spray     lamoTRIgine (LAMICTAL PO)     No current facility-administered medications for this visit.        Social History     Tobacco Use     Smoking status: Never Smoker     Smokeless tobacco: Never Used   Substance Use Topics     Alcohol use: Yes     Alcohol/week: 1.0 standard drinks     Types: 1 Glasses of wine per week     Drug use: No       Honoring Choices - Health Care Directive Guide offered to patient at time of visit.    Health Maintenance Due   Topic Date Due     DEPRESSION ACTION PLAN  1995     DTAP/TDAP/TD IMMUNIZATION (1 - Tdap) 06/17/2018     PREVENTIVE CARE VISIT  06/06/2020     PHQ-9  06/30/2020     INFLUENZA VACCINE (1) 09/01/2020 "         There is no immunization history on file for this patient.    Lab Results   Component Value Date    PAP NIL 06/06/2019         Recent Labs   Lab Test 04/28/20  1817 06/06/19  1450 06/18/18  1024 05/31/17  1341 05/31/17  1341   ALT  --   --  22  --   --    CR 0.79 0.77 0.75   < >  --    GFRESTIMATED >90 >90 >90   < >  --    GFRESTBLACK >90 >90 >90   < >  --    ALBUMIN  --   --  4.2  --   --    POTASSIUM 3.5 4.0 3.8   < >  --    TSH  --  0.82  --   --  0.57    < > = values in this interval not displayed.       PHQ-2 ( 1999 Pfizer) 12/31/2019 6/1/2018   Q1: Little interest or pleasure in doing things 0 0   Q2: Feeling down, depressed or hopeless 0 0   PHQ-2 Score 0 0   Q1: Little interest or pleasure in doing things - Not at all   Q2: Feeling down, depressed or hopeless - Not at all   PHQ-2 Score - 0       PHQ-9 SCORE 12/31/2019   PHQ-9 Total Score 3       CHRISTELLE-7 SCORE 6/1/2018 12/31/2019   Total Score 1 (minimal anxiety) -   Total Score 1 3       No flowsheet data found.      Raegan Ortega CMA  October 20, 2020 10:49 AM

## 2020-10-20 NOTE — PROGRESS NOTES
Progress Note    SUBJECTIVE:  Araceli Sellers is an 25 year old with a history of anxiety, depression, seasonal allergies, and chronic constipation who requests an Annual Preventive Exam. Patient reports being off Effexor earlier this year and was trialed on another medication briefly. She experienced lightheadedness frequently until she restarted medication. Mood reported as stable today. Chronic constipation with bloating and abdominal cramping continues to be an issue for patient but she reports a stable home regimen that allows her to have 3-4 bowel movements a week. 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     Had flu vaccine at work this fall.      No current concerns.    Menstrual History: Patient currently has Mirena IUD (placed 6/18) in place, does not get regular menstrual periods but does report some spotting.    Last    Lab Results   Component Value Date    PAP NIL 06/06/2019       Us Breast Left    Result Date: 8/11/2020  Exam:  Left breast ultrasound  FINDINGS: Targeted left breast ultrasound by radiologist and technologist demonstrates oval circumscribed mass without malignant features 9:00 position 6 cm from the nipple, 1.2 x 0.6 x 1.3 cm, no significant change since 6/10/2019 accounting for differences in technique.   1 year F/U recommended.  Previous bilateral US 6/2019 with 6 month follow up recommended.  Us Breast Bilateral Limited 1-3 Quadrants      HISTORY:  MEDICATIONS       Cholecalciferol (VITAMIN D PO), Take 1 tablet by mouth daily       levonorgestrel (MIRENA, 52 MG,) 20 MCG/24HR IUD, 1 each (20 mcg) by Intrauterine route once for 1 dose       loratadine (CLARITIN) 10 MG tablet, Take 1 tablet (10 mg) by mouth daily       MELATONIN, 1 tablet as needed        traZODone (DESYREL) 50 MG tablet,        venlafaxine (EFFEXOR) 75 MG tablet, Take 75 mg by mouth daily        fluticasone (FLONASE) 50 MCG/ACT nasal spray, Spray 1-2 sprays into  "both nostrils daily (Patient not taking: Reported on 10/20/2020)       lamoTRIgine (LAMICTAL PO), Take 12.5 mg by mouth daily    No current facility-administered medications on file prior to visit.     Allergies   Allergen Reactions     Fluoxetine      Other reaction(s): Unknown  Patient reports \"heart hurts\" and racing, throat feels like it is closing about 10 min after taking med.  Took 3 doses, symptoms worse each dose.     Seasonal Allergies      Sertraline Nausea     Other reaction(s): GI Upset         OB History   No obstetric history on file.     Past Medical History:   Diagnosis Date     Breast mass      Bulimia     Resolved     Constipation 2017     Depression 2010     Seasonal allergies      History reviewed. No pertinent surgical history.  Family History   Problem Relation Age of Onset     Diabetes Maternal Grandfather      Hypertension Maternal Grandfather      Cardiovascular Maternal Grandfather      Social History     Socioeconomic History     Marital status: Single     Spouse name: None     Number of children: None     Years of education: None     Highest education level: None   Occupational History     None   Social Needs     Financial resource strain: None     Food insecurity     Worry: None     Inability: None     Transportation needs     Medical: None     Non-medical: None   Tobacco Use     Smoking status: Never Smoker     Smokeless tobacco: Never Used   Substance and Sexual Activity     Alcohol use: Yes     Alcohol/week: 1.0 standard drinks     Types: 1 Glasses of wine per week     Drug use: No     Sexual activity: Not Currently       ROS  General: Denies fever, chills, changes in weight or appetite.  HEENT: Reports recent lasik surgery, no vision changes since. No changes in hearing, no pain or tinnitus. Reports intermittently mild headaches and recent mild sore throat with some post nasal drainage.   ENDO: negative for weight change  Breast: history of multiple breast lumps with " "previous US, most recent Lt breast US 8/20 with plan for repeat in 1 year. Does self breast exam.   Cardiovascular: Denies chest pain, dyspnea on exertion, or heart palpations.  Respiratory: Denies shortness of breath, cough, or sputum.  Gastrointestinal: Denies abdominal pain, nausea, or vomiting. Reports chronic constipation associated with bloating and mild cramping that is manageable with current home regimen. Typically has 3-4 bowel movements a week.   Genital/urinary: Denies frequency, urgency, or dysuria. Reported past vaginal itching that has since resolved after switching soaps. LPS 2019 NIL  Skin: Denies skin, hair, or nail changes. Reports recent cut on right thumb that she is keeping clean and it is healing well.  Neuro: negative for headache  MSK:negative for joint or back pain  MOOD: negative for depressive symptoms    PHQ-9 SCORE 12/31/2019 10/20/2020   PHQ-9 Total Score 3 5     CHRISTELLE-7 SCORE 6/1/2018 12/31/2019 10/20/2020   Total Score 1 (minimal anxiety) - -   Total Score 1 3 7       EXAM:  Blood pressure 111/74, pulse 88, temperature 99.2  F (37.3  C), temperature source Oral, height 1.816 m (5' 11.5\"), weight 65 kg (143 lb 4.8 oz), SpO2 99 %, not currently breastfeeding. Body mass index is 19.71 kg/m .  General - pleasant female in no acute distress.  Skin - no suspicious lesions or rashes. Pale appearing with dark circles under eyes.   EENT-  Palpebral conjunctivae pale, PERRLA,  EOM intact. Discs crisp. Ears: TMs gray with LR, scarring noted lateral TMs bilaterally.  Nose: clear, OP: Mucous membranes moist. Post nasal drip noted. OP Pink, no exudate.   Neck - supple without lymphadenopathy. Trachea midline. euthyroid with out palpable nodules.  Lungs - clear to auscultation bilaterally. Unlabored breathing with no accessory muscle use.  Heart - S1, S2 regular rate and rhythm without murmur, rub or gallop. No peripheral edema or cyanosis noted. Orthostatic BPS: 114/71 supine; 104/69 sitting, 109/73 " Standing  Abdomen - Sluggish BS x4, soft, nontender, nondistended, no masses or organomegaly noted.  Musculoskeletal - no gross deformities. Negative ankle edema  Neuro:  Intact  MOOD: see PHQ-9 and CHRISTELLE-7    Breast Exam:  Breast: Without visible skin changes. No dimpling or lesions seen. Breasts supple, non-tender multiple masses with palpation, without nipple discharge. Palpable lumps Left: 1.5 cm 9 o'clock, fibrous area upper quadrants bilaterally. Right: mobile palpable 1.5 cm nodule at 8 o'clock. Diffuse fibrous band 10 o'clock to 12 o'clock left breast Negative lymph.  Pelvic Exam:  EG/BUS: Normal genital architecture without lesions, erythema or abnormal secretions Bartholin's, Urethra; shaved mons.  BUS without tenderness, erythema or drainage.  Urethra: no masses, tenderness, or scarring   Bladder: no masses or tenderness  Vagina: moist, pink, rugae with creamy, odorless secretions  Cervix: pink, moist, closed, without lesion; IUD stings visible at os. Scant bloody discharge at OS  Uterus: anteverted,  and slightly displaced to left  Adnexa: Within normal limits and No masses, nodularity, tenderness    ASSESSMENT:  Encounter Diagnoses   Name Primary?     Routine adult health maintenance Yes     Lipid screening      Pale skin      Lump or mass in breast      History of syncope         PLAN:   Orders Placed This Encounter   Procedures     CBC with platelets     Ferritin     Lipid panel reflex to direct LDL Fasting     Orthostatic blood pressure and pulse     Orders Placed This Encounter   Medications     traZODone (DESYREL) 50 MG tablet     Health Maintenance:  -CBC, Ferritin, and lipids ordered.  Healthy lifestyle of a well-balanced diet, plenty of hydration, and exercise reinforced.  Continue current regimen for chronic constipation.  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     Syncope:  -Advised  patient to rise slowly from a seated or lying position to prevent symptoms of lightheadedness and prevent fainting. Encouraged fluid intake to maintain adequate hydration. Orthostatic BPs did not show significant drop from sitting to standing.     I was present with the [facility] student, Riddhi Garrett RN, who participated in the service and in the documentation of the services provided. I have verified the history and personally performed the physical exam and medical decision making, as documented by the student and edited by me    MAYRA Dowling CNP  10/20/20  1:48 PM

## 2020-10-20 NOTE — PROGRESS NOTES
"The patient has been notified of following:     \"This telephone visit will be conducted via a call between you and your physician/provider. We have found that certain health care needs can be provided without the need for a physical exam.  This service lets us provide the care you need with a short phone conversation.  If a prescription is necessary we can send it directly to your pharmacy.  If lab work is needed we can place an order for that and you can then stop by our lab to have the test done at a later time.    If during the course of the call the physician/provider feels a telephone visit is not appropriate, you will not be charged for this service.\"       Subjective     CC: Araceli Sellers  is a 25 year old female who presents to clinic today for the following health issues:   Chief Complaint   Patient presents with     Physical          History:  In the 14 days before your symptoms started, have you had close contact with a COVID-19 (Coronavirus) patient? {yes/no/unsure/blank:867434}    In the 14 days before your symptoms started, have you traveled internationally or to a state with high rates of COVID19? {COVID19 YES WITH TRAVEL/NO:370525}    Do you have a fever? {fever?:227338}    Are you having difficulty breathing? {COVID19 DIFFICULTY BREATHIN}    Do you have a cough? {COVID19 COUGH YES:325982}    Are you experiencing any of the following? {ANY OF THE FOLLOWIN}    What other symptoms have you experienced? {OTHER SX:181794}    Do you have any of the following? {ADDITIONAL SX:253421}    Have you ever been diagnosed with asthma, bronchitis, or lung disease? {yes/no/unsure/blank:686952}    Do you smoke? {COVID19 DO YOU SMOKE:782983}    Are there people you know with similar symptoms? {COVID19 DO YOU KNOW PEOPLE WITH SX:771640}    Have you recently been hospitalized? {COVID19 RECENT HOSPITALIZATIONS:267656}    Are you pregnant or breastfeeding?: {YES/NO:864044}      {HIST REVIEW/ LINKS 2 " "(Optional):953013}    {Additional problems for the provider to add (optional):983891}  Reviewed and updated as needed this visit by Provider                 Review of Systems   {ROS COMP (Optional):659416}      Objective    {phoneexam:365971::\"Gen: Patient is alert, oriented\"}    {Diagnostic Test Results (Optional):371868::\"Diagnostic Test Results:\",\"Labs reviewed in Epic\"}          Assessment/Plan:  {Diagnosis, Associated Orders and Comment:165336}    Phone call duration:  *** minutes    {signature options:707243}         "

## 2020-10-21 ASSESSMENT — ANXIETY QUESTIONNAIRES: GAD7 TOTAL SCORE: 7

## 2020-12-20 ENCOUNTER — HEALTH MAINTENANCE LETTER (OUTPATIENT)
Age: 25
End: 2020-12-20

## 2021-05-30 VITALS — HEIGHT: 71 IN | BODY MASS INDEX: 21.25 KG/M2 | WEIGHT: 151.8 LBS

## 2021-06-09 NOTE — PROGRESS NOTES
Assessment/Plan:         1. Blood in stool  HM2(CBC w/o Differential)    Comprehensive Metabolic Panel    CT Abdomen Pelvis With Oral With IV Contrast    CANCELED: CT Abdomen With Oral With IV Contrast   2. Constipation  CT Abdomen Pelvis With Oral With IV Contrast    CANCELED: CT Abdomen With Oral With IV Contrast   3. Generalized abdominal pain  HM2(CBC w/o Differential)    Comprehensive Metabolic Panel    CT Abdomen Pelvis With Oral With IV Contrast    CANCELED: CT Abdomen With Oral With IV Contrast     Etiology of the change in bowel habits is not clear at this point.  Obviously the blood within the stool is concerning and needs further workup.  The generalized abdominal pain and constipation can be also concerning.  Blood work that was completed to the office visit is below at the end of this note.  Blood work is within normal limits.  No leukocytosis, anemia, abnormalities in the liver or kidneys.  A CT scan of the abdomen is ordered she will be having this completed on 4/7.  Patient may need a referral to gastroenterology for further workup based on the results of the CT scan when they are available.  I discussed these options with the patient and she is in agreement this plan.  Patient will follow-up if symptoms return or if stool patterns change.  Patient will keep me updated.  She is in agreement with this plan and will follow-up as needed.       Subjective:      Araceli Sellers is a 21 y.o. female who presents for concerns of blood in stool. She reports about 6 weeks ago she had blood in her stool. The bowel movements were painless and not difficult to pass. No diarrhea or constipation when having blood in the stool. She reports that the blood was bright red that was within the stool. No black stools. She thought that this would go away and decided to wait. The blood had stopped 2 weeks after it started. She then traveled to [place] and started to experience constipation. She started to increase her  water intake, took magnesium and increased her fiber intake. She was having a hard time going to the bathroom. She recently went 6 days without having a bowel movement. She has now had a bowel movement daily with the assistance of probiotics, miralax, and fiber foods. The stools are now looser and floating. She has also noticed undigested foods and brown stool. She has not noted any blood in her stool since. She feels like her stomach is hyperactive and is having a lot of gas. She is also having some abdominal pain in her left lower quadrant. Appetite has been up and down. She has been eating but when she was constipated her appetite had decreased and she felt nauseated. She is now feeling more hungry and nausea has improved. She denies vomiting. She does have a history of hemorrhoids as a young child. No family history of colon cancer.     The following portions of the patient's history were reviewed and updated as appropriate: allergies, current medications and problem list.    Past Medical History:   Diagnosis Date     Anxiety      Depression      Patient Active Problem List   Diagnosis     Chronic Major Depression     Appetite disorder     Breast Neoplasm     Epistaxis     Intracervical pessary     ADD (attention deficit disorder)     Anxiety, generalized       History reviewed. No pertinent surgical history.    Family History   Problem Relation Age of Onset     No Medical Problems Mother      No Medical Problems Father      Diabetes Maternal Grandfather      Hypertension Maternal Grandfather      Cancer Neg Hx      Stroke Neg Hx      Heart disease Neg Hx      Social History     Social History     Marital status: Single     Spouse name: N/A     Number of children: N/A     Years of education: N/A     Occupational History     Not on file.     Social History Main Topics     Smoking status: Never Smoker     Smokeless tobacco: Never Used     Alcohol use Yes      Comment: occasionally     Drug use: No     Sexual  "activity: Yes     Partners: Male     Birth control/ protection: IUD, Condom     Other Topics Concern     Not on file     Social History Narrative     Review of Systems   Pertinent items are noted in HPI.      Objective:      /60 (Patient Site: Right Arm, Patient Position: Sitting, Cuff Size: Adult Regular)  Pulse 84  Resp 18  Ht 5' 11\" (1.803 m)  Wt 151 lb 12.8 oz (68.9 kg)  Breastfeeding? No  BMI 21.17 kg/m2    General appearance: alert, appears stated age and cooperative  Head: Normocephalic, without obvious abnormality, atraumatic  Lungs: clear to auscultation bilaterally  Heart: regular rate and rhythm, S1, S2 normal, no murmur, click, rub or gallop  Abdomen: soft, non-tender; bowel sounds normal; no masses,  no organomegaly  Extremities: extremities normal, atraumatic, no cyanosis or edema  Skin: Skin color, texture, turgor normal. No rashes or lesions  Neurologic: Grossly normal     Results for orders placed or performed in visit on 04/04/17   2(CBC w/o Differential)   Result Value Ref Range    WBC 5.8 4.0 - 11.0 thou/uL    RBC 4.38 3.80 - 5.40 mill/uL    Hemoglobin 14.2 12.0 - 16.0 g/dL    Hematocrit 41.7 35.0 - 47.0 %    MCV 95 80 - 100 fL    MCH 32.4 27.0 - 34.0 pg    MCHC 34.0 32.0 - 36.0 g/dL    RDW 11.4 11.0 - 14.5 %    Platelets 173 140 - 440 thou/uL    MPV 8.5 7.0 - 10.0 fL   Comprehensive Metabolic Panel   Result Value Ref Range    Sodium 142 136 - 145 mmol/L    Potassium 3.8 3.5 - 5.0 mmol/L    Chloride 104 98 - 107 mmol/L    CO2 28 22 - 31 mmol/L    Anion Gap, Calculation 10 5 - 18 mmol/L    Glucose 76 70 - 125 mg/dL    BUN 15 8 - 22 mg/dL    Creatinine 0.81 0.60 - 1.10 mg/dL    GFR MDRD Af Amer >60 >60 mL/min/1.73m2    GFR MDRD Non Af Amer >60 >60 mL/min/1.73m2    Bilirubin, Total 0.3 0.0 - 1.0 mg/dL    Calcium 9.2 8.5 - 10.5 mg/dL    Protein, Total 7.0 6.0 - 8.0 g/dL    Albumin 4.1 3.5 - 5.0 g/dL    Alkaline Phosphatase 53 45 - 120 U/L    AST 23 0 - 40 U/L    ALT 30 0 - 45 U/L       "

## 2021-06-11 NOTE — TELEPHONE ENCOUNTER
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 If you develop symptoms such as fever, cough, or SOB or if you have concerns about the presence of another infection including coronavirus (COVID-19), please contact your health care provider or visit [email or web address].     Disposition/Instructions    Home care recommended. Follow home care protocol based instructions.    Thank you for taking steps to prevent the spread of this virus.  o Limit your contact with others.  o Wear a simple mask to cover your cough.  o Wash your hands well and often.    Resources    [facility]: About COVID-19: [email or web address]    CDC: What to Do If You're Sick: www.cdc.gov/coronavirus/2019-ncov/about/steps-when-sick.html    CDC: Ending Home Isolation: www.cdc.gov/coronavirus/2019-ncov/hcp/disposition-in-home-patients.html     CDC: Caring for Someone: www.cdc.gov/coronavirus/2019-ncov/if-you-are-sick/care-for-someone.html     [facility]: Interim Guidance for Hospital Discharge to Home: [email or web address]/diseases/coronavirus/hcp/hospdischarge.pdf    [facility] clinical trials (COVID-19 research studies): [email or web address]     Below are the COVID-19 hotlines at the [facility] ([facility]). Interpreters are available.   o For health questions: Call 503-725-9789 or 1-184.675.5469 (7 a.m. to 7 p.m.)  o For questions about schools and childcare: Call 032-765-6035 or 1-158.410.9006 (7 a.m. to 7 p.m.)                        Reason for Disposition     [1] COVID-19 diagnosed by positive lab test AND [2] mild symptoms (e.g., cough, fever, others) AND [3] no complications or SOB    Additional Information    Negative: SEVERE difficulty breathing (e.g., struggling for each breath, speaks in single words)    Negative: Difficult to awaken or acting confused (e.g., disoriented, slurred speech)    Negative: Bluish (or gray) lips or face now    Negative: Shock suspected (e.g., cold/pale/clammy skin, too weak to stand, low BP, rapid pulse)    Negative: Sounds like a life-threatening emergency to the triager    Negative: Patient sounds very sick or weak to the triager    Negative: SEVERE or constant chest pain or pressure (Exception: mild central chest pain, present only when coughing)    Negative: MODERATE difficulty breathing (e.g., speaks in phrases, SOB even at rest, pulse 100-120)    Negative: [1] COVID-19 exposure AND [2] no symptoms    Negative: COVID-19 and Breastfeeding, questions about    Negative: [1] Adult with possible COVID-19 symptoms AND [2] triager concerned about severity of symptoms or other causes    Negative: MILD difficulty breathing (e.g., minimal/no SOB at rest, SOB with walking, pulse <100)    Negative: Chest pain or pressure    Negative: Fever > 103 F (39.4 C)    Negative: [1] Fever > 101 F (38.3 C) AND [2] age > 60    Negative: [1] Fever > 100.0 F (37.8 C) AND [2] bedridden (e.g., nursing home patient, CVA, chronic illness, recovering from surgery)    Negative: HIGH RISK patient (e.g., age > 64 years, diabetes, heart or lung disease, weak immune system)    Negative: Fever present > 3 days (72 hours)    Negative: [1] Fever returns after gone for over 24 hours AND [2] symptoms worse or not improved    Negative: [1] Continuous (nonstop) coughing interferes with work or school AND [2] no improvement using cough treatment per protocol    Negative: [1] COVID-19 infection suspected by caller or triager AND [2] mild symptoms (cough, fever, or others) AND  [3] no complications or SOB    Negative: Cough present > 3 weeks    Protocols used: CORONAVIRUS (COVID-19) DIAGNOSED OR [id number] 5.16.20

## 2021-10-03 ENCOUNTER — HEALTH MAINTENANCE LETTER (OUTPATIENT)
Age: 26
End: 2021-10-03

## 2021-11-28 ENCOUNTER — HEALTH MAINTENANCE LETTER (OUTPATIENT)
Age: 26
End: 2021-11-28

## 2022-09-10 ENCOUNTER — HEALTH MAINTENANCE LETTER (OUTPATIENT)
Age: 27
End: 2022-09-10

## 2023-01-21 ENCOUNTER — HEALTH MAINTENANCE LETTER (OUTPATIENT)
Age: 28
End: 2023-01-21

## 2024-02-05 SDOH — HEALTH STABILITY: PHYSICAL HEALTH: ON AVERAGE, HOW MANY DAYS PER WEEK DO YOU ENGAGE IN MODERATE TO STRENUOUS EXERCISE (LIKE A BRISK WALK)?: 4 DAYS

## 2024-02-05 SDOH — HEALTH STABILITY: PHYSICAL HEALTH: ON AVERAGE, HOW MANY MINUTES DO YOU ENGAGE IN EXERCISE AT THIS LEVEL?: 30 MIN

## 2024-02-05 ASSESSMENT — SOCIAL DETERMINANTS OF HEALTH (SDOH): HOW OFTEN DO YOU GET TOGETHER WITH FRIENDS OR RELATIVES?: MORE THAN THREE TIMES A WEEK

## 2024-02-06 ENCOUNTER — OFFICE VISIT (OUTPATIENT)
Dept: FAMILY MEDICINE | Facility: CLINIC | Age: 29
End: 2024-02-06
Payer: COMMERCIAL

## 2024-02-06 VITALS
DIASTOLIC BLOOD PRESSURE: 60 MMHG | OXYGEN SATURATION: 99 % | SYSTOLIC BLOOD PRESSURE: 112 MMHG | RESPIRATION RATE: 20 BRPM | HEIGHT: 72 IN | HEART RATE: 83 BPM | WEIGHT: 144.8 LBS | TEMPERATURE: 98.4 F | BODY MASS INDEX: 19.61 KG/M2

## 2024-02-06 DIAGNOSIS — Z71.84 ENCOUNTER FOR COUNSELING FOR TRAVEL: ICD-10-CM

## 2024-02-06 DIAGNOSIS — Z00.00 ROUTINE GENERAL MEDICAL EXAMINATION AT A HEALTH CARE FACILITY: Primary | ICD-10-CM

## 2024-02-06 LAB
ERYTHROCYTE [DISTWIDTH] IN BLOOD BY AUTOMATED COUNT: 11.8 % (ref 10–15)
HCT VFR BLD AUTO: 44 % (ref 35–47)
HGB BLD-MCNC: 14.4 G/DL (ref 11.7–15.7)
MCH RBC QN AUTO: 31.4 PG (ref 26.5–33)
MCHC RBC AUTO-ENTMCNC: 32.7 G/DL (ref 31.5–36.5)
MCV RBC AUTO: 96 FL (ref 78–100)
PLATELET # BLD AUTO: 208 10E3/UL (ref 150–450)
RBC # BLD AUTO: 4.59 10E6/UL (ref 3.8–5.2)
WBC # BLD AUTO: 4.8 10E3/UL (ref 4–11)

## 2024-02-06 PROCEDURE — 82607 VITAMIN B-12: CPT | Performed by: NURSE PRACTITIONER

## 2024-02-06 PROCEDURE — 82728 ASSAY OF FERRITIN: CPT | Performed by: NURSE PRACTITIONER

## 2024-02-06 PROCEDURE — 83550 IRON BINDING TEST: CPT | Performed by: NURSE PRACTITIONER

## 2024-02-06 PROCEDURE — 90713 POLIOVIRUS IPV SC/IM: CPT | Performed by: NURSE PRACTITIONER

## 2024-02-06 PROCEDURE — 83735 ASSAY OF MAGNESIUM: CPT | Performed by: NURSE PRACTITIONER

## 2024-02-06 PROCEDURE — 99213 OFFICE O/P EST LOW 20 MIN: CPT | Mod: 25 | Performed by: NURSE PRACTITIONER

## 2024-02-06 PROCEDURE — 85027 COMPLETE CBC AUTOMATED: CPT | Performed by: NURSE PRACTITIONER

## 2024-02-06 PROCEDURE — 84443 ASSAY THYROID STIM HORMONE: CPT | Performed by: NURSE PRACTITIONER

## 2024-02-06 PROCEDURE — 83540 ASSAY OF IRON: CPT | Performed by: NURSE PRACTITIONER

## 2024-02-06 PROCEDURE — 36415 COLL VENOUS BLD VENIPUNCTURE: CPT | Performed by: NURSE PRACTITIONER

## 2024-02-06 PROCEDURE — 99385 PREV VISIT NEW AGE 18-39: CPT | Mod: 25 | Performed by: NURSE PRACTITIONER

## 2024-02-06 PROCEDURE — 90471 IMMUNIZATION ADMIN: CPT | Performed by: NURSE PRACTITIONER

## 2024-02-06 PROCEDURE — 90472 IMMUNIZATION ADMIN EACH ADD: CPT | Performed by: NURSE PRACTITIONER

## 2024-02-06 PROCEDURE — 90632 HEPA VACCINE ADULT IM: CPT | Performed by: NURSE PRACTITIONER

## 2024-02-06 RX ORDER — VENLAFAXINE HYDROCHLORIDE 75 MG/1
CAPSULE, EXTENDED RELEASE ORAL
COMMUNITY
Start: 2024-01-29

## 2024-02-06 RX ORDER — LAMOTRIGINE 100 MG/1
200 TABLET ORAL DAILY
COMMUNITY

## 2024-02-06 RX ORDER — LAMOTRIGINE 250 MG/1
250 TABLET, EXTENDED RELEASE ORAL
COMMUNITY
End: 2024-02-06

## 2024-02-06 RX ORDER — ETONOGESTREL AND ETHINYL ESTRADIOL VAGINAL RING .015; .12 MG/D; MG/D
RING VAGINAL
COMMUNITY
Start: 2023-09-19

## 2024-02-06 ASSESSMENT — PAIN SCALES - GENERAL: PAINLEVEL: NO PAIN (0)

## 2024-02-06 NOTE — PATIENT INSTRUCTIONS
"[facility] travel clinic can give you the Yellow fever shot  Typhoid vaccine - can get at [facility]  Polio and Hepatitis A vaccines for travel - given to you today    Eating Healthy Foods: Care Instructions  With every meal, you can make healthy food choices. Try to eat a variety of fruits, vegetables, whole grains, lean proteins, and low-fat dairy products. This can help you get the right balance of nutrients, including vitamins and minerals. Small changes add up over time. You can start by adding one healthy food to your meals each day.    Try to make half your plate fruits and vegetables, one-fourth whole grains, and one-fourth lean proteins. Try including dairy with your meals.   Eat more fruits and vegetables. Try to have them with most meals and snacks.   Foods for healthy eating    Fruits    These can be fresh, frozen, canned, or dried.  Try to choose whole fruit rather than fruit juice.  Eat a variety of colors.    Vegetables    These can be fresh, frozen, canned, or dried.  Beans, peas, and lentils count too.    Whole grains    Choose whole-grain breads, cereals, and noodles.  Try brown rice.    Lean proteins    These can include lean meat, poultry, fish, and eggs.  You can also have tofu, beans, peas, lentils, nuts, and seeds.    Dairy    Try milk, yogurt, and cheese.  Choose low-fat or fat-free when you can.  If you need to, use lactose-free milk or fortified plant-based milk products, such as soy milk.    Water    Drink water when you're thirsty.  Limit sugar-sweetened drinks, including soda, fruit drinks, and sports drinks.  Where can you learn more?  Go to https://www.[employer].net/patiented  Enter T756 in the search box to learn more about \"Eating Healthy Foods: Care Instructions.\"  Current as of: February 28, 2023               Content Version: 13.8    8605-1071 [employer].   Care instructions adapted under license by your healthcare professional. If you have questions " about a medical condition or this instruction, always ask your healthcare professional. Healthwise, [facility] disclaims any warranty or liability for your use of this information.      Learning About Stress  What is stress?     Stress is your body's response to a hard situation. Your body can have a physical, emotional, or mental response. Stress is a fact of life for most people, and it affects everyone differently. What causes stress for you may not be stressful for someone else.  A lot of things can cause stress. You may feel stress when you go on a job interview, take a test, or run a race. This kind of short-term stress is normal and even useful. It can help you if you need to work hard or react quickly. For example, stress can help you finish an important job on time.  Long-term stress is caused by ongoing stressful situations or events. Examples of long-term stress include long-term health problems, ongoing problems at work, or conflicts in your family. Long-term stress can harm your health.  How does stress affect your health?  When you are stressed, your body responds as though you are in danger. It makes hormones that speed up your heart, make you breathe faster, and give you a burst of energy. This is called the fight-or-flight stress response. If the stress is over quickly, your body goes back to normal and no harm is done.  But if stress happens too often or lasts too long, it can have bad effects. Long-term stress can make you more likely to get sick, and it can make symptoms of some diseases worse. If you tense up when you are stressed, you may develop neck, shoulder, or low back pain. Stress is linked to high blood pressure and heart disease.  Stress also harms your emotional health. It can make you conrad, tense, or depressed. Your relationships may suffer, and you may not do well at work or school.  What can you do to manage stress?  You can try these things to help manage stress:   Do something  active. Exercise or activity can help reduce stress. Walking is a great way to get started. Even everyday activities such as housecleaning or yard work can help.  Try yoga or serg chi. These techniques combine exercise and meditation. You may need some training at first to learn them.  Do something you enjoy. For example, listen to music or go to a movie. Practice your hobby or do volunteer work.  Meditate. This can help you relax, because you are not worrying about what happened before or what may happen in the future.  Do guided imagery. Imagine yourself in any setting that helps you feel calm. You can use online videos, books, or a teacher to guide you.  Do breathing exercises. For example:  From a standing position, bend forward from the waist with your knees slightly bent. Let your arms dangle close to the floor.  Breathe in slowly and deeply as you return to a standing position. Roll up slowly and lift your head last.  Hold your breath for just a few seconds in the standing position.  Breathe out slowly and bend forward from the waist.  Let your feelings out. Talk, laugh, cry, and express anger when you need to. Talking with supportive friends or family, a counselor, or a dylan leader about your feelings is a healthy way to relieve stress. Avoid discussing your feelings with people who make you feel worse.  Write. It may help to write about things that are bothering you. This helps you find out how much stress you feel and what is causing it. When you know this, you can find better ways to cope.  What can you do to prevent stress?  You might try some of these things to help prevent stress:  Manage your time. This helps you find time to do the things you want and need to do.  Get enough sleep. Your body recovers from the stresses of the day while you are sleeping.  Get support. Your family, friends, and community can make a difference in how you experience stress.  Limit your news feed. Avoid or limit time on  "social media or news that may make you feel stressed.  Do something active. Exercise or activity can help reduce stress. Walking is a great way to get started.  Where can you learn more?  Go to https://www.[employer].net/patiented  Enter N032 in the search box to learn more about \"Learning About Stress.\"  Current as of: February 26, 2023               Content Version: 13.8    [id number] [employer].   Care instructions adapted under license by your healthcare professional. If you have questions about a medical condition or this instruction, always ask your healthcare professional. [employer] disclaims any warranty or liability for your use of this information.      Substance Use Disorder: Care Instructions  Overview     You can improve your life and health by stopping your use of alcohol or drugs. When you don't drink or use drugs, you may feel and sleep better. You may get along better with your family, friends, and coworkers. There are medicines and programs that can help with substance use disorder.  How can you care for yourself at home?  Here are some ways to help you stay sober and prevent relapse.  If you have been given medicine to help keep you sober or reduce your cravings, be sure to take it exactly as prescribed.  Talk to your doctor about programs that can help you stop using drugs or drinking alcohol.  Do not keep alcohol or drugs in your home.  Plan ahead. Think about what you'll say if other people ask you to drink or use drugs. Try not to spend time with people who drink or use drugs.  Use the time and money spent on drinking or drugs to do something that's important to you.  Preventing a relapse  Have a plan to deal with relapse. Learn to recognize changes in your thinking that lead you to drink or use drugs. Get help before you start to drink or use drugs again.  Try to stay away from situations, friends, or places that may lead you to drink or use drugs.  If you feel the " need to drink alcohol or use drugs again, seek help right away. Call a trusted friend or family member. Some people get support from organizations such as Narcotics Anonymous or [employer] or from treatment facilities.  If you relapse, get help as soon as you can. Some people make a plan with another person that outlines what they want that person to do for them if they relapse. The plan usually includes how to handle the relapse and who to notify in case of relapse.  Don't give up. Remember that a relapse doesn't mean that you have failed. Use the experience to learn the triggers that lead you to drink or use drugs. Then quit again. Recovery is a lifelong process. Many people have several relapses before they are able to quit for good.  Follow-up care is a key part of your treatment and safety. Be sure to make and go to all appointments, and call your doctor if you are having problems. It's also a good idea to know your test results and keep a list of the medicines you take.  When should you call for help?   Call 911  anytime you think you may need emergency care. For example, call if you or someone else:    Has overdosed or has withdrawal signs. Be sure to tell the emergency workers that you are or someone else is using or trying to quit using drugs. Overdose or withdrawal signs may include:  Losing consciousness.  Seizure.  Seeing or hearing things that aren't there (hallucinations).     Is thinking or talking about suicide or harming others.   Where to get help 24 hours a day, 7 days a week   If you or someone you know talks about suicide, self-harm, a mental health crisis, a substance use crisis, or any other kind of emotional distress, get help right away. You can:    Call the Suicide and Crisis Lifeline at 308.     Call 5-089-737-TALK (1-503.104.5309).     Text HOME to 238326 to access the Crisis Text Line.   Consider saving these numbers in your phone.  Go to [employer] for more information or to  "chat online.  Call your doctor now or seek immediate medical care if:    You are having withdrawal symptoms. These may include nausea or vomiting, sweating, shakiness, and anxiety.   Watch closely for changes in your health, and be sure to contact your doctor if:    You have a relapse.     You need more help or support to stop.   Where can you learn more?  Go to https://www.[employer].net/patiented  Enter H573 in the search box to learn more about \"Substance Use Disorder: Care Instructions.\"  Current as of: March 21, 2023               Content Version: 13.8    [id number] [employer].   Care instructions adapted under license by your healthcare professional. If you have questions about a medical condition or this instruction, always ask your healthcare professional. [employer] disclaims any warranty or liability for your use of this information.      Preventive Care Advice   This is general advice given by our system to help you stay healthy. However, your care team may have specific advice just for you. Please talk to your care team about your preventive care needs.  Nutrition  Eat 5 or more servings of fruits and vegetables each day.  Try wheat bread, brown rice and whole grain pasta (instead of white bread, rice, and pasta).  Get enough calcium and vitamin D. Check the label on foods and aim for 100% of the RDA (recommended daily allowance).  Lifestyle  Exercise at least 150 minutes each week  (30 minutes a day, 5 days a week).  Do muscle strengthening activities 2 days a week. These help control your weight and prevent disease.  No smoking.  Wear sunscreen to prevent skin cancer.  Have a dental exam and cleaning every 6 months.  Yearly exams  See your health care team every year to talk about:  Any changes in your health.  Any medicines your care team has prescribed.  Preventive care, family planning, and ways to prevent chronic diseases.  Shots (vaccines)   HPV shots (up to age 26), if " you've never had them before.  Hepatitis B shots (up to age 59), if you've never had them before.  COVID-19 shot: Get this shot when it's due.  Flu shot: Get a flu shot every year.  Tetanus shot: Get a tetanus shot every 10 years.  Pneumococcal, hepatitis A, and RSV shots: Ask your care team if you need these based on your risk.  Shingles shot (for age 50 and up)  General health tests  Diabetes screening:  Starting at age 35, Get screened for diabetes at least every 3 years.  If you are younger than age 35, ask your care team if you should be screened for diabetes.  Cholesterol test: At age 39, start having a cholesterol test every 5 years, or more often if advised.  Bone density scan (DEXA): At age 50, ask your care team if you should have this scan for osteoporosis (brittle bones).  Hepatitis C: Get tested at least once in your life.  STIs (sexually transmitted infections)  Before age 24: Ask your care team if you should be screened for STIs.  After age 24: Get screened for STIs if you're at risk. You are at risk for STIs (including HIV) if:  You are sexually active with more than one person.  You don't use condoms every time.  You or a partner was diagnosed with a sexually transmitted infection.  If you are at risk for HIV, ask about PrEP medicine to prevent HIV.  Get tested for HIV at least once in your life, whether you are at risk for HIV or not.  Cancer screening tests  Cervical cancer screening: If you have a cervix, begin getting regular cervical cancer screening tests starting at age 21.  Breast cancer scan (mammogram): If you've ever had breasts, begin having regular mammograms starting at age 40. This is a scan to check for breast cancer.  Colon cancer screening: It is important to start screening for colon cancer at age 45.  Have a colonoscopy test every 10 years (or more often if you're at risk) Or, ask your provider about stool tests like a FIT test every year or Cologuard test every 3 years.  To  learn more about your testing options, visit:   https://[email or web address]/743720.pdf.  For help making a decision, visit:   https://bit.ly/zs90746.  Prostate cancer screening test: If you have a prostate, ask your care team if a prostate cancer screening test (PSA) at age 55 is right for you.  Lung cancer screening: If you are a current or former smoker ages 50 to 80, ask your care team if ongoing lung cancer screenings are right for you.  For informational purposes only. Not to replace the advice of your health care provider. Copyright   2023 [facility]. All rights reserved. Clinically reviewed by the [facility] Transitions Program. Plasmon [id number] - REV 01/24.    Medications for treatment of constipation     1) Bulk forming laxatives (fiber supplements) - Avoid until getting better    Your colons job is to absorb the liquid in your stool.  As we get older the colon or other medications can slow down the colon and allow the stool to get to firm.  Fiber mixes with your stool and does not allow all the water to get absorbed by the colon.  This will not give you diarrhea in fact I use it for people with diarrhea since it causes the stool to bulk up more and is easier to control.     Below are several fiber options.     For your constipation try using Metamucil or it's generic equivalent 1-2 tablespoons with a large glass of water every day.       You can also use flax seed that is easily obtained at your grocery store. Make sure it is ground up and sprinkle 2 tablespoons on your cereal each morning and drink a large glass of water with it.  You can also mix in yogurt, and even bake in bread or muffins.    Flax seed seems to give less gas and does not have any taste.     - Psyllium (Metamucil) Take up to 1 tablespoon three times daily  - Methylcellulose (Citrucel) Take up to 1 tablespoon or 4 caplets three times daily  - Polycarbophil (FiberCon) Take 2-4 caplets daily  - Wheat dextrin (Benefiber)  "Take 1-3 caplets or 2 teaspoons three times daily     2) Stool softeners  - Docusate sodium (Colace, DulcoEase, Dulcolax, Fleet, Mc) Take 100mg twice daily     3) Osmotic agents (bring water into the gut)  - Polyethylene glycol (Miralax) Take 8.5 to 34 grams in 8 ounces of water daily to twice daily  - Glycerin suppository (Fleet) Insert one suppository per rectum for 15 minutes daily  - Magnesium citrate Take 200mL daily     4) Stimulant laxatives - Last resort (can cause cramping)  - Bisacodyl (Dulcolax) Take 10 to 30 mg daily  - Senna (Ex-lax, Senokot) Take 2 to 4 tablets (8.6mg each) or 1 to 2 tablets (15mg each) as a single daily dose or divided twice daily         Iron Rich Foods    Avoid dairy, coffee, and tea (which decrease absorption of iron) for an hour before or after a meal.    Cook with a cast-iron skillet or a product like a James Iron Fish    Eat \"heme iron\" rich foods such as beef, eggs, tuna, lamb, and kangaroo (if available)    Improve absorption of \"non-heme iron\" sources (almonds, figs, apricots, kidney beans, green leafy vegetables, tempeh, tofu, dark chocolate) by pairing with heme-rich sources and/or 50 milligrams (mg) of vitamin C (1/2 cup of pineapple, strawberries, broccoli, or red peppers)    If taking an iron supplement (prescribed by a provider), take with vitamin C - rich food    "

## 2024-02-06 NOTE — PROGRESS NOTES
Preventive Care Visit  [facility]  Susi Diego NP, Nurse Practitioner - Family  Feb 6, 2024    Assessment & Plan     Routine general medical examination at a health care facility  - TSH with free T4 reflex  - CBC with platelets  - Ferritin  - Iron and iron binding capacity  - Vitamin B12  - Magnesium    Encounter for counseling for travel  We spent time today discussing her upcoming 2 week travel to [place]; we reviewed [facility] travel recommendations.  I also reviewed [facility] and her prior vaccines.  Polio and Hepatitis A vaccines give today after patient consented for agreement of receiving.  Recommend going to [facility] pharmacy to get the typhoid vaccine and going to [facility] travel clinic to get the Yellow fever vaccine        Counseling  Appropriate preventive services were discussed with this patient, including applicable screening as appropriate for fall prevention, nutrition, physical activity, Tobacco-use cessation, weight loss and cognition.  Checklist reviewing preventive services available has been given to the patient.  Reviewed patient's diet, addressing concerns and/or questions.   She is at risk for psychosocial distress and has been provided with information to reduce risk.       Timothy Charles is a 28 year old, presenting for the following:  Physical (Last ate at 8am )        2/6/2024    11:01 AM   Additional Questions   Roomed by Mami REYES        Health Care Directive  Patient does not have a Health Care Directive or Living Will:     HPI    Going to [place] for 2 weeks and would like to discuss if there are any needed vaccines that she needs for travel.    Mag citrate 900 mg - taking          2/5/2024   General Health   How would you rate your overall physical health? Excellent   Feel stress (tense, anxious, or unable to sleep) Only a little   (!) STRESS CONCERN      2/5/2024   Nutrition   Three or more servings of calcium each day? (!) NO    Diet: Gluten-free/reduced   How many servings of fruit and vegetables per day? (!) 2-3   How many sweetened beverages each day? 0-1         2/5/2024   Exercise   Days per week of moderate/strenous exercise 4 days   Average minutes spent exercising at this level 30 min         2/5/2024   Social Factors   Frequency of gathering with friends or relatives More than three times a week   Worry food won't last until get money to buy more No   Food not last or not have enough money for food? No   Do you have housing?  Yes   Are you worried about losing your housing? No   Lack of transportation? No   Unable to get utilities (heat,electricity)? No         2/5/2024   Dental   Dentist two times every year? Yes         2/5/2024   TB Screening   Were you born outside of [place]?  No         Today's PHQ-2 Score:       2/5/2024     1:49 PM   PHQ-2 ( 1999 Pf[employer])   Q1: Little interest or pleasure in doing things 0   Q2: Feeling down, depressed or hopeless 0   PHQ-2 Score 0   Q1: Little interest or pleasure in doing things Not at all   Q2: Feeling down, depressed or hopeless Not at all   PHQ-2 Score 0           2/5/2024   Substance Use   Alcohol more than 3/day or more than 7/wk No   Do you use any other substances recreationally? (!) ALCOHOL    (!) CANNABIS PRODUCTS     Social History     Tobacco Use    Smoking status: Never    Smokeless tobacco: Never   Vaping Use    Vaping Use: Never used   Substance Use Topics    Alcohol use: Yes     Alcohol/week: 1.0 standard drink of alcohol     Types: 1 Glasses of wine per week    Drug use: No             2/6/2024   Breast Cancer Screening   Family history of breast, colon, or ovarian cancer? No / Unknown      Mammogram Screening - Patient under 40 years of age: Routine Mammogram Screening not recommended.         2/5/2024   STI Screening   New sexual partner(s) since last STI/HIV test? No     History of abnormal Pap smear: NO - age 30- 65 PAP every 3 years recommended        6/6/2019     2:23 PM  5/17/2016    12:06 PM   PAP / HPV   PAP  Negative for squamous intraepithelial lesion or malignancy  Electronically signed by Radha Wilcox CT (ASCP) on 5/26/2016 at  1:30 PM      PAP (Historical) NIL             2/5/2024   Contraception/Family Planning   Questions about contraception or family planning No        Reviewed and updated as needed this visit by Provider       Med Hx  Surg Hx  Fam Hx            Labs reviewed in EPIC  BP Readings from Last 3 Encounters:   02/10/24 115/76   02/06/24 112/60   10/20/20 111/74    Wt Readings from Last 3 Encounters:   02/10/24 63.5 kg (140 lb)   02/06/24 65.7 kg (144 lb 12.8 oz)   10/20/20 65 kg (143 lb 4.8 oz)                  Patient Active Problem List   Diagnosis    Mirena IUD placed 6/4/18    Irritable bowel syndrome with both constipation and diarrhea     History reviewed. No pertinent surgical history.    Social History     Tobacco Use    Smoking status: Never    Smokeless tobacco: Never   Substance Use Topics    Alcohol use: Yes     Alcohol/week: 1.0 standard drink of alcohol     Types: 1 Glasses of wine per week     Family History   Problem Relation Age of Onset    Diabetes Maternal Grandfather     Hypertension Maternal Grandfather     Cardiovascular Maternal Grandfather     No Known Problems Mother     No Known Problems Father     Cancer No family hx of     Cerebrovascular Disease No family hx of     Heart Disease No family hx of          Current Outpatient Medications   Medication Sig Dispense Refill    Cholecalciferol (VITAMIN D PO) Take 1 tablet by mouth daily      etonogestrel-ethinyl estradiol (NUVARING) 0.12-0.015 MG/24HR vaginal ring Use for 4 weeks, then remove. After 7 days can replace with new ring.      lamoTRIgine (LAMICTAL) 100 MG tablet 200 mg daily      MAGNESIUM CITRATE PO Take by mouth daily 900mg      traZODone (DESYREL) 50 MG tablet       UNABLE TO FIND 3 tablets 2 times daily MEDICATION NAME: Super Cleanse.   Cascara powder       "venlafaxine (EFFEXOR XR) 75 MG 24 hr capsule       venlafaxine (EFFEXOR) 75 MG tablet Take 75 mg by mouth daily       Allergies   Allergen Reactions    Fluoxetine      Other reaction(s): Unknown  Patient reports \"heart hurts\" and racing, throat feels like it is closing about 10 min after taking med.  Took 3 doses, symptoms worse each dose.    Seasonal Allergies     Sertraline Nausea     Other reaction(s): GI Upset     Review of Systems       Objective    Exam  /60 (BP Location: Right arm, Patient Position: Sitting, Cuff Size: Adult Regular)   Pulse 83   Temp 98.4  F (36.9  C) (Oral)   Resp 20   Ht 1.816 m (5' 11.5\")   Wt 65.7 kg (144 lb 12.8 oz)   LMP 02/01/2024   SpO2 99%   BMI 19.91 kg/m     Estimated body mass index is 19.91 kg/m  as calculated from the following:    Height as of this encounter: 1.816 m (5' 11.5\").    Weight as of this encounter: 65.7 kg (144 lb 12.8 oz).    Physical Exam  GENERAL: alert and no distress  EYES: Eyes grossly normal to inspection, PERRL and conjunctivae and sclerae normal  HENT: ear canals and TM's normal, nose and mouth without ulcers or lesions  NECK: no adenopathy, no asymmetry, masses, or scars  RESP: lungs clear to auscultation - no rales, rhonchi or wheezes  BREAST: normal without masses, tenderness or nipple discharge and no palpable axillary masses or adenopathy  CV: regular rate and rhythm, normal S1 S2, no S3 or S4, no murmur, click or rub, no peripheral edema  ABDOMEN: soft, nontender, no hepatosplenomegaly, no masses and bowel sounds normal  SKIN: no suspicious lesions or rashes  NEURO: Normal strength and tone, mentation intact and speech normal  PSYCH: mentation appears normal, affect normal/bright      Signed Electronically by: uSsi Diego NP    "

## 2024-02-06 NOTE — NURSING NOTE
Prior to immunization administration, verified patients identity using patient s name and date of birth. Please see Immunization Activity for additional information.     Screening Questionnaire for Adult Immunization    Are you sick today?   No   Do you have allergies to medications, food, a vaccine component or latex?   No   Have you ever had a serious reaction after receiving a vaccination?   No   Do you have a long-term health problem with heart, lung, kidney, or metabolic disease (e.g., diabetes), asthma, a blood disorder, no spleen, complement component deficiency, a cochlear implant, or a spinal fluid leak?  Are you on long-term aspirin therapy?   No   Do you have cancer, leukemia, HIV/AIDS, or any other immune system problem?   No   Do you have a parent, brother, or sister with an immune system problem?   No   In the past 3 months, have you taken medications that affect  your immune system, such as prednisone, other steroids, or anticancer drugs; drugs for the treatment of rheumatoid arthritis, Crohn s disease, or psoriasis; or have you had radiation treatments?   No   Have you had a seizure, or a brain or other nervous system problem?   No   During the past year, have you received a transfusion of blood or blood    products, or been given immune (gamma) globulin or antiviral drug?   No   For women: Are you pregnant or is there a chance you could become       pregnant during the next month?   No   Have you received any vaccinations in the past 4 weeks?   No     Immunization questionnaire answers were all negative.  Vaccine give per TRISTAN DAVENPORT , as Patient is traveling next week.         Patient instructed to remain in clinic for 15 minutes afterwards, and to report any adverse reactions.     Screening performed by Petrona Prieto MA on 2/6/2024 at 12:44 PM.

## 2024-02-07 LAB
FERRITIN SERPL-MCNC: 28 NG/ML (ref 6–175)
IRON BINDING CAPACITY (ROCHE): 287 UG/DL (ref 240–430)
IRON SATN MFR SERPL: 60 % (ref 15–46)
IRON SERPL-MCNC: 172 UG/DL (ref 37–145)
MAGNESIUM SERPL-MCNC: 2 MG/DL (ref 1.7–2.3)
TSH SERPL DL<=0.005 MIU/L-ACNC: 0.69 UIU/ML (ref 0.3–4.2)
VIT B12 SERPL-MCNC: 451 PG/ML (ref 232–1245)

## 2024-02-10 ENCOUNTER — OFFICE VISIT (OUTPATIENT)
Dept: FAMILY MEDICINE | Facility: CLINIC | Age: 29
End: 2024-02-10
Payer: COMMERCIAL

## 2024-02-10 VITALS
RESPIRATION RATE: 16 BRPM | WEIGHT: 140 LBS | DIASTOLIC BLOOD PRESSURE: 76 MMHG | SYSTOLIC BLOOD PRESSURE: 115 MMHG | OXYGEN SATURATION: 98 % | HEART RATE: 85 BPM | HEIGHT: 72 IN | BODY MASS INDEX: 18.96 KG/M2 | TEMPERATURE: 98.1 F

## 2024-02-10 DIAGNOSIS — E55.9 VITAMIN D DEFICIENCY DISEASE: Primary | ICD-10-CM

## 2024-02-10 DIAGNOSIS — R53.83 FATIGUE, UNSPECIFIED TYPE: ICD-10-CM

## 2024-02-10 DIAGNOSIS — L65.9 HAIR LOSS: ICD-10-CM

## 2024-02-10 DIAGNOSIS — R79.0 RAISED SERUM IRON: ICD-10-CM

## 2024-02-10 LAB
ALBUMIN SERPL BCG-MCNC: 4.6 G/DL (ref 3.5–5.2)
ALP SERPL-CCNC: 42 U/L (ref 40–150)
ALT SERPL W P-5'-P-CCNC: 22 U/L (ref 0–50)
ANION GAP SERPL CALCULATED.3IONS-SCNC: 4 MMOL/L (ref 7–15)
AST SERPL W P-5'-P-CCNC: 21 U/L (ref 0–45)
BILIRUB SERPL-MCNC: 0.4 MG/DL
BUN SERPL-MCNC: 13.9 MG/DL (ref 6–20)
CALCIUM SERPL-MCNC: 9.3 MG/DL (ref 8.6–10)
CHLORIDE SERPL-SCNC: 109 MMOL/L (ref 98–107)
CREAT SERPL-MCNC: 0.8 MG/DL (ref 0.51–0.95)
CRP SERPL-MCNC: <3 MG/L
DEPRECATED HCO3 PLAS-SCNC: 26 MMOL/L (ref 22–29)
EGFRCR SERPLBLD CKD-EPI 2021: >90 ML/MIN/1.73M2
ERYTHROCYTE [SEDIMENTATION RATE] IN BLOOD BY WESTERGREN METHOD: 8 MM/HR (ref 0–20)
FOLATE SERPL-MCNC: 11.1 NG/ML (ref 4.6–34.8)
GLUCOSE SERPL-MCNC: 85 MG/DL (ref 70–99)
POTASSIUM SERPL-SCNC: 4.1 MMOL/L (ref 3.4–5.3)
PROT SERPL-MCNC: 7.1 G/DL (ref 6.4–8.3)
SODIUM SERPL-SCNC: 139 MMOL/L (ref 135–145)
VIT D+METAB SERPL-MCNC: 52 NG/ML (ref 20–50)

## 2024-02-10 PROCEDURE — 82746 ASSAY OF FOLIC ACID SERUM: CPT | Performed by: FAMILY MEDICINE

## 2024-02-10 PROCEDURE — 82306 VITAMIN D 25 HYDROXY: CPT | Performed by: FAMILY MEDICINE

## 2024-02-10 PROCEDURE — 86140 C-REACTIVE PROTEIN: CPT | Performed by: FAMILY MEDICINE

## 2024-02-10 PROCEDURE — 36415 COLL VENOUS BLD VENIPUNCTURE: CPT | Performed by: FAMILY MEDICINE

## 2024-02-10 PROCEDURE — 85652 RBC SED RATE AUTOMATED: CPT | Performed by: FAMILY MEDICINE

## 2024-02-10 PROCEDURE — 80053 COMPREHEN METABOLIC PANEL: CPT | Performed by: FAMILY MEDICINE

## 2024-02-10 PROCEDURE — 99213 OFFICE O/P EST LOW 20 MIN: CPT | Performed by: FAMILY MEDICINE

## 2024-02-10 NOTE — PROGRESS NOTES
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    No chest pain    Not short of breath    Eats fiber    Greens    Well balanced diet    Some meat    ROS    Physical Exam  Constitutional:       Appearance: She is well-developed.   HENT:      Head: Normocephalic and atraumatic.   Eyes:      Conjunctiva/sclera: Conjunctivae normal.   Neck:      Vascular: No carotid bruit.   Cardiovascular:      Rate and Rhythm: Normal rate and regular rhythm.      Heart sounds: Normal heart sounds.   Pulmonary:      Effort: Pulmonary effort is normal. No respiratory distress.      Breath sounds: Normal breath sounds.   Neurological:      Mental Status: She is alert and oriented to person, place, and time.     Radials symmetric    No edema    Radials symmetric    No areas of alopecia noted on scalp    Patient is a bit tender in upper abdomen    Reviewed past and recent labs    Dewayne some more today    ASSESSMENT / PLAN:  (E55.9) Vitamin D deficiency disease  (primary encounter diagnosis)  Comment: check level; on over the counter vitamin D in winter  Plan: Vitamin D Deficiency             (R53.83) Fatigue, unspecified type  Comment: check additional labs   Plan: Comprehensive metabolic panel, Folate, CRP,         inflammation, ESR: Erythrocyte sedimentation         rate             (R79.0) Raised serum iron  Comment: patient has normal ferritin but high iron level.  Wonder about iron overload.  No family history of hemachromatosis.    Plan: patient to have additional labs done, then follow up primary care.  Would benefit from abd imaging, could start with ultrasound     (L65.9) Hair loss  Comment:  diffuse  Plan: follow up with primary care      I reviewed the patient's medications, allergies, medical history, family history, and social history.    Fahad Ricardo MD

## 2024-02-10 NOTE — PATIENT INSTRUCTIONS
Follow up with primary care soon    Check mychart for results    Consider abdominal imaging    Be seen promptly if symptoms acutely worsen

## 2024-02-11 NOTE — RESULT ENCOUNTER NOTE
The other test for inflammation ( c reactive protein ) is also normal.    Don't worry about the low anion gap; only worrisome if high.    Vitamin D is barely high based on the new stricter normal range.  Not likely causing any problems.    Follow up in clinic as advised.    Fahad Ricardo MD

## 2024-02-15 ENCOUNTER — OFFICE VISIT (OUTPATIENT)
Dept: FAMILY MEDICINE | Facility: CLINIC | Age: 29
End: 2024-02-15
Payer: COMMERCIAL

## 2024-02-15 VITALS
DIASTOLIC BLOOD PRESSURE: 74 MMHG | HEART RATE: 80 BPM | TEMPERATURE: 96.8 F | BODY MASS INDEX: 19.67 KG/M2 | OXYGEN SATURATION: 100 % | SYSTOLIC BLOOD PRESSURE: 110 MMHG | WEIGHT: 143 LBS

## 2024-02-15 DIAGNOSIS — R10.10 UPPER ABDOMINAL PAIN: Primary | ICD-10-CM

## 2024-02-15 DIAGNOSIS — R51.9 NONINTRACTABLE HEADACHE, UNSPECIFIED CHRONICITY PATTERN, UNSPECIFIED HEADACHE TYPE: ICD-10-CM

## 2024-02-15 DIAGNOSIS — E83.19 IRON OVERLOAD: ICD-10-CM

## 2024-02-15 PROCEDURE — 99213 OFFICE O/P EST LOW 20 MIN: CPT | Performed by: FAMILY MEDICINE

## 2024-02-15 RX ORDER — AZITHROMYCIN 500 MG/1
TABLET, FILM COATED ORAL
Qty: 3 TABLET | Refills: 0 | Status: SHIPPED | OUTPATIENT
Start: 2024-02-15

## 2024-02-15 ASSESSMENT — PAIN SCALES - GENERAL: PAINLEVEL: SEVERE PAIN (6)

## 2024-02-15 NOTE — PROGRESS NOTES
"      Timothy Charles is a 28 year old, presenting for the following health issues:  RECHECK (Follow up from 02/10/2024)        2/15/2024    10:20 AM   Additional Questions   Roomed by Wanda Reed MA   Accompanied by Self     History of Present Illness       Headaches:   Since the patient's last clinic visit, headaches are: worsened  The patient is getting headaches:  60%of the time  She is able to do normal daily activities when she has a migraine.  The patient is taking the following rescue/relief medications:  Naproxyn (Aleve)   Patient states \"I get only a small amount of relief\" from the rescue/relief medications.   The patient is taking the following medications to prevent migraines:  No medications to prevent migraines  In the past 4 weeks, the patient has gone to an Urgent Care or Emergency Room 1 time times due to headaches.    Reason for visit:  Lab follow-up and not feeling well    She eats 2-3 servings of fruits and vegetables daily.She consumes 0 sweetened beverage(s) daily.She exercises with enough effort to increase her heart rate 60 or more minutes per day.  She exercises with enough effort to increase her heart rate 5 days per week.   She is taking medications regularly.           Still tired but not as bad    No cough/ fever    Some nausea    No appetite    No vomiting    Made  it in  to work yesterday    Headache 9-10 days    No history of headaches          Objective    /74 (BP Location: Left arm, Patient Position: Chair, Cuff Size: Adult Regular)   Pulse 80   Temp 96.8  F (36  C) (Temporal)   Wt 64.9 kg (143 lb)   LMP 02/01/2024 (Approximate)   SpO2 100%   BMI 19.67 kg/m    Body mass index is 19.67 kg/m .  Physical Exam  Constitutional:       Appearance: She is well-developed.   HENT:      Head: Normocephalic and atraumatic.      Right Ear: Tympanic membrane, ear canal and external ear normal.      Left Ear: Tympanic membrane, ear canal and external ear normal.      Nose: " Nose normal.      Mouth/Throat:      Mouth: Mucous membranes are moist.      Pharynx: Oropharynx is clear.   Eyes:      Conjunctiva/sclera: Conjunctivae normal.   Neck:      Vascular: No carotid bruit.   Cardiovascular:      Rate and Rhythm: Normal rate and regular rhythm.      Heart sounds: Normal heart sounds.   Pulmonary:      Effort: Pulmonary effort is normal. No respiratory distress.      Breath sounds: Normal breath sounds.   Abdominal:      General: There is no distension.      Palpations: Abdomen is soft. There is no mass.      Tenderness: There is abdominal tenderness (very mild subjective tenderness upper abd). There is no right CVA tenderness, left CVA tenderness, guarding or rebound.   Neurological:      Mental Status: She is alert and oriented to person, place, and time.      No sinus/ submandib/ scalp tenderness         Reviewed labs and past imaging study reports in detail              ASSESSMENT / PLAN:  (R10.10) Upper abdominal pain  (primary encounter diagnosis)  Comment: prudent to do ultrasound; patient to schedule   Plan: US Abdomen Complete             (E83.19) Iron overload  Comment: curious iron studies with low normal ferritin but high iron level.  Patient is not on any supplemental iron.  Hemoglobin fine.  Advised hematology consult.   Plan: Adult Oncology/Hematology  Referral             (R51.9) Nonintractable headache, unspecified chronicity pattern, unspecified headache type  Comment: unusual for patient.  Does not feel like bad infection.  Has been there for 1-2 weeks.   Plan:call if not resolving soon.      Be seen promptly if symptoms acutely worsen.       I reviewed the patient's medications, allergies, medical history, family history, and social history.    Fahad Ricardo MD            Signed Electronically by: Fahad Ricardo MD

## 2024-02-16 ENCOUNTER — PATIENT OUTREACH (OUTPATIENT)
Dept: ONCOLOGY | Facility: CLINIC | Age: 29
End: 2024-02-16
Payer: COMMERCIAL

## 2024-02-16 NOTE — PROGRESS NOTES
New Patient Oncology Nurse Navigator Note     Referring provider: Fahad Ricardo MD      Referring Clinic/Organization: [facility]    Referred to (specialty:) Hematology/Oncology     Requested provider (if applicable): NA     Date Referral Received: February 16, 2024     Evaluation for:  E83.19 (ICD-10-CM) - Iron overload   Elevated  iron level in pt who is not on iron.     Clinical History (per Nurse review of records provided):       Latest Reference Range & Units 02/06/24 12:36   Ferritin 6 - 175 ng/mL 28   Iron 37 - 145 ug/dL 172 (H)   Iron Binding Capacity 240 - 430 ug/dL 287   Iron Sat Index 15 - 46 % 60 (H)   TSH 0.30 - 4.20 uIU/mL 0.69   Vitamin B12 232 - 1,245 pg/mL 451   WBC 4.0 - 11.0 10e3/uL 4.8   Hemoglobin 11.7 - 15.7 g/dL 14.4   Hematocrit 35.0 - 47.0 % 44.0   Platelet Count 150 - 450 10e3/uL 208   RBC Count 3.80 - 5.20 10e6/uL 4.59   MCV 78 - 100 fL 96   MCH 26.5 - 33.0 pg 31.4   MCHC 31.5 - 36.5 g/dL 32.7   RDW 10.0 - 15.0 % 11.8   (H): Data is abnormally high       Records Location: See Bookmarked material     Records Needed: NA     Additional testing needed prior to consult: NA    Payor: [facility] / Plan: [facility] CHOICE / Product Type: Indemnity /     February 16, 2024  Referral reviewed and received. Sent to NPS to schedule.     Vinita GARCÍAN, RN   Oncology Nurse Navigator   [facility]   [phone] / [phone]

## 2024-02-21 ENCOUNTER — ONCOLOGY VISIT (OUTPATIENT)
Dept: ONCOLOGY | Facility: HOSPITAL | Age: 29
End: 2024-02-21
Attending: FAMILY MEDICINE
Payer: COMMERCIAL

## 2024-02-21 ENCOUNTER — LAB (OUTPATIENT)
Dept: INFUSION THERAPY | Facility: HOSPITAL | Age: 29
End: 2024-02-21
Attending: FAMILY MEDICINE
Payer: COMMERCIAL

## 2024-02-21 VITALS
RESPIRATION RATE: 18 BRPM | BODY MASS INDEX: 19.64 KG/M2 | WEIGHT: 145 LBS | SYSTOLIC BLOOD PRESSURE: 114 MMHG | OXYGEN SATURATION: 99 % | HEART RATE: 92 BPM | DIASTOLIC BLOOD PRESSURE: 73 MMHG | HEIGHT: 72 IN

## 2024-02-21 DIAGNOSIS — E83.19 IRON OVERLOAD: ICD-10-CM

## 2024-02-21 LAB
BASOPHILS # BLD AUTO: 0 10E3/UL (ref 0–0.2)
BASOPHILS NFR BLD AUTO: 1 %
EOSINOPHIL # BLD AUTO: 0 10E3/UL (ref 0–0.7)
EOSINOPHIL NFR BLD AUTO: 1 %
ERYTHROCYTE [DISTWIDTH] IN BLOOD BY AUTOMATED COUNT: 11.9 % (ref 10–15)
FERRITIN SERPL-MCNC: 32 NG/ML (ref 6–175)
HCT VFR BLD AUTO: 40.1 % (ref 35–47)
HGB BLD-MCNC: 13.7 G/DL (ref 11.7–15.7)
IMM GRANULOCYTES # BLD: 0 10E3/UL
IMM GRANULOCYTES NFR BLD: 0 %
IRON BINDING CAPACITY (ROCHE): 311 UG/DL (ref 240–430)
IRON SATN MFR SERPL: 41 % (ref 15–46)
IRON SERPL-MCNC: 129 UG/DL (ref 37–145)
LYMPHOCYTES # BLD AUTO: 1.7 10E3/UL (ref 0.8–5.3)
LYMPHOCYTES NFR BLD AUTO: 42 %
MCH RBC QN AUTO: 31.6 PG (ref 26.5–33)
MCHC RBC AUTO-ENTMCNC: 34.2 G/DL (ref 31.5–36.5)
MCV RBC AUTO: 93 FL (ref 78–100)
MONOCYTES # BLD AUTO: 0.2 10E3/UL (ref 0–1.3)
MONOCYTES NFR BLD AUTO: 5 %
NEUTROPHILS # BLD AUTO: 2.1 10E3/UL (ref 1.6–8.3)
NEUTROPHILS NFR BLD AUTO: 51 %
NRBC # BLD AUTO: 0 10E3/UL
NRBC BLD AUTO-RTO: 0 /100
PLATELET # BLD AUTO: 236 10E3/UL (ref 150–450)
RBC # BLD AUTO: 4.33 10E6/UL (ref 3.8–5.2)
WBC # BLD AUTO: 4.1 10E3/UL (ref 4–11)

## 2024-02-21 PROCEDURE — 99213 OFFICE O/P EST LOW 20 MIN: CPT | Performed by: INTERNAL MEDICINE

## 2024-02-21 PROCEDURE — 85025 COMPLETE CBC W/AUTO DIFF WBC: CPT | Performed by: INTERNAL MEDICINE

## 2024-02-21 PROCEDURE — 99204 OFFICE O/P NEW MOD 45 MIN: CPT | Performed by: INTERNAL MEDICINE

## 2024-02-21 PROCEDURE — 36415 COLL VENOUS BLD VENIPUNCTURE: CPT | Performed by: INTERNAL MEDICINE

## 2024-02-21 PROCEDURE — 83550 IRON BINDING TEST: CPT | Performed by: INTERNAL MEDICINE

## 2024-02-21 PROCEDURE — 82728 ASSAY OF FERRITIN: CPT | Performed by: INTERNAL MEDICINE

## 2024-02-21 ASSESSMENT — PAIN SCALES - GENERAL: PAINLEVEL: MODERATE PAIN (4)

## 2024-02-21 NOTE — PROGRESS NOTES
"Oncology Rooming Note    February 21, 2024 9:28 AM   Araceli Sellers is a 28 year old female who presents for:    Chief Complaint   Patient presents with    Hematology     New consult iron overload     Initial Vitals: /73   Pulse 92   Resp 18   Ht 1.816 m (5' 11.5\")   Wt 65.8 kg (145 lb)   LMP 02/01/2024 (Approximate)   SpO2 99%   BMI 19.94 kg/m   Estimated body mass index is 19.94 kg/m  as calculated from the following:    Height as of this encounter: 1.816 m (5' 11.5\").    Weight as of this encounter: 65.8 kg (145 lb). Body surface area is 1.82 meters squared.  Moderate Pain (4) Comment: Data Unavailable   Patient's last menstrual period was 02/01/2024 (approximate).  Allergies reviewed: Yes  Medications reviewed: Yes    Medications: Medication refills not needed today.  Pharmacy name entered into Meican:    [id number] - [place], [place] - [address] [place] [address] [facility] - [place], [place] - [address] [place] [address]    Frailty Screening:   Is the patient here for a new oncology consult visit in cancer care? 2. No      Clinical concerns:  new consult iron overload      Neela Marina            "

## 2024-02-21 NOTE — LETTER
"    2/21/2024         RE: Araceli Sellers  [address] [facility] [address]        Dear Colleague,    Thank you for referring your patient, Araceli Sellers, to the [facility]. Please see a copy of my visit note below.    Oncology Rooming Note    February 21, 2024 9:28 AM   Araceli Sellers is a 28 year old female who presents for:    Chief Complaint   Patient presents with     Hematology     New consult iron overload     Initial Vitals: /73   Pulse 92   Resp 18   Ht 1.816 m (5' 11.5\")   Wt 65.8 kg (145 lb)   LMP 02/01/2024 (Approximate)   SpO2 99%   BMI 19.94 kg/m   Estimated body mass index is 19.94 kg/m  as calculated from the following:    Height as of this encounter: 1.816 m (5' 11.5\").    Weight as of this encounter: 65.8 kg (145 lb). Body surface area is 1.82 meters squared.  Moderate Pain (4) Comment: Data Unavailable   Patient's last menstrual period was 02/01/2024 (approximate).  Allergies reviewed: Yes  Medications reviewed: Yes    Medications: Medication refills not needed today.  Pharmacy name entered into Analyze Re:    [id number] - [place], [place] - [address] [place] [address] [facility]ARM[facility] [place], [place] - [address] [place] [address]    Frailty Screening:   Is the patient here for a new oncology consult visit in cancer care? 2. 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  She was seen by her PCP due to headaches, fatigue and hair loss over the past month.  Her iron stores were drawn and she had a mildly elevated serum iron but normal " "ferritin.  She had been given iron in the past due to low iron.  She has regular and not especially heavy menses.    There is no family history of blood disorder.  Several men have had prostate cancer.    ECOG = 0    Patient Active Problem List   Diagnosis     Mirena IUD placed 6/4/18     Irritable bowel syndrome with both constipation and diarrhea     Current Outpatient Medications   Medication     etonogestrel-ethinyl estradiol (NUVARING) 0.12-0.015 MG/24HR vaginal ring     lamoTRIgine (LAMICTAL) 100 MG tablet     MAGNESIUM CITRATE PO     traZODone (DESYREL) 50 MG tablet     UNABLE TO FIND     venlafaxine (EFFEXOR XR) 75 MG 24 hr capsule     azithromycin (ZITHROMAX) 500 MG tablet     No current facility-administered medications for this visit.     Past Medical History:   Diagnosis Date     Breast mass      Bulimia (H28) 2012    Resolved     Constipation 2017     Depression 2010     Seasonal allergies      No past surgical history on file.  Socioeconomic History     Marital status: Single   Occupational History     Occupation: Registered Nurse     Social Determinants of Health     Social Connections: Unknown (2/5/2024)    Social Connection and Isolation Panel [NHANES]      Frequency of Social Gatherings with Friends and Family: More than three times a week   Interpersonal Safety: Low Risk  (2/6/2024)    Interpersonal Safety      Do you feel physically and emotionally safe where you currently live?: Yes      Within the past 12 months, have you been hit, slapped, kicked or otherwise physically hurt by someone?: No      Within the past 12 months, have you been humiliated or emotionally abused in other ways by your partner or ex-partner?: No     Review of Systems - Oncology    /73   Pulse 92   Resp 18   Ht 1.816 m (5' 11.5\")   Wt 65.8 kg (145 lb)   LMP 02/01/2024 (Approximate)   SpO2 99%   BMI 19.94 kg/m      Physical Exam  Vitals and nursing note reviewed.   Constitutional:       Appearance: Normal " appearance. She is well-groomed and normal weight. She is not ill-appearing.      Comments: Pretty, slender, well appearing white female   HENT:      Head: Normocephalic and atraumatic.      Mouth/Throat:      Mouth: Mucous membranes are moist.      Dentition: Normal dentition. No dental caries.   Eyes:      Extraocular Movements: Extraocular movements intact.      Conjunctiva/sclera: Conjunctivae normal.      Pupils: Pupils are equal, round, and reactive to light.   Cardiovascular:      Rate and Rhythm: Normal rate and regular rhythm.      Heart sounds: Normal heart sounds.   Pulmonary:      Effort: Pulmonary effort is normal.      Breath sounds: Normal breath sounds.   Abdominal:      General: There is no distension.      Palpations: Abdomen is soft. There is no mass.      Tenderness: There is no abdominal tenderness. There is no guarding.   Musculoskeletal:         General: No deformity.      Cervical back: Normal range of motion and neck supple.      Right lower leg: No edema.      Left lower leg: No edema.   Lymphadenopathy:      Cervical: No cervical adenopathy.      Upper Body:      Right upper body: No axillary or epitrochlear adenopathy.      Left upper body: No axillary or epitrochlear adenopathy.   Skin:     General: Skin is warm and dry.   Neurological:      General: No focal deficit present.      Mental Status: She is alert and oriented to person, place, and time.      Cranial Nerves: No cranial nerve deficit.      Motor: No weakness.      Gait: Gait normal.      Deep Tendon Reflexes: Reflexes normal.   Psychiatric:         Mood and Affect: Mood normal.         Behavior: Behavior normal. Behavior is cooperative.         Thought Content: Thought content normal.         Judgment: Judgment normal.         Recent Results (from the past 720 hour(s))   TSH with free T4 reflex    Collection Time: 02/06/24 12:36 PM   Result Value Ref Range    TSH 0.69 0.30 - 4.20 uIU/mL   CBC with platelets    Collection Time:  02/06/24 12:36 PM   Result Value Ref Range    WBC Count 4.8 4.0 - 11.0 10e3/uL    RBC Count 4.59 3.80 - 5.20 10e6/uL    Hemoglobin 14.4 11.7 - 15.7 g/dL    Hematocrit 44.0 35.0 - 47.0 %    MCV 96 78 - 100 fL    MCH 31.4 26.5 - 33.0 pg    MCHC 32.7 31.5 - 36.5 g/dL    RDW 11.8 10.0 - 15.0 %    Platelet Count 208 150 - 450 10e3/uL   Ferritin    Collection Time: 02/06/24 12:36 PM   Result Value Ref Range    Ferritin 28 6 - 175 ng/mL   Iron and iron binding capacity    Collection Time: 02/06/24 12:36 PM   Result Value Ref Range    Iron 172 (H) 37 - 145 ug/dL    Iron Binding Capacity 287 240 - 430 ug/dL    Iron Sat Index 60 (H) 15 - 46 %   Vitamin B12    Collection Time: 02/06/24 12:36 PM   Result Value Ref Range    Vitamin B12 451 232 - 1,245 pg/mL   Magnesium    Collection Time: 02/06/24 12:36 PM   Result Value Ref Range    Magnesium 2.0 1.7 - 2.3 mg/dL   Comprehensive metabolic panel    Collection Time: 02/10/24  2:13 PM   Result Value Ref Range    Sodium 139 135 - 145 mmol/L    Potassium 4.1 3.4 - 5.3 mmol/L    Carbon Dioxide (CO2) 26 22 - 29 mmol/L    Anion Gap 4 (L) 7 - 15 mmol/L    Urea Nitrogen 13.9 6.0 - 20.0 mg/dL    Creatinine 0.80 0.51 - 0.95 mg/dL    GFR Estimate >90 >60 mL/min/1.73m2    Calcium 9.3 8.6 - 10.0 mg/dL    Chloride 109 (H) 98 - 107 mmol/L    Glucose 85 70 - 99 mg/dL    Alkaline Phosphatase 42 40 - 150 U/L    AST 21 0 - 45 U/L    ALT 22 0 - 50 U/L    Protein Total 7.1 6.4 - 8.3 g/dL    Albumin 4.6 3.5 - 5.2 g/dL    Bilirubin Total 0.4 <=1.2 mg/dL   Vitamin D Deficiency    Collection Time: 02/10/24  2:13 PM   Result Value Ref Range    Vitamin D, Total (25-Hydroxy) 52 (H) 20 - 50 ng/mL   Folate    Collection Time: 02/10/24  2:13 PM   Result Value Ref Range    Folic Acid 11.1 4.6 - 34.8 ng/mL   CRP, inflammation    Collection Time: 02/10/24  2:13 PM   Result Value Ref Range    CRP Inflammation <3.00 <5.00 mg/L   ESR: Erythrocyte sedimentation rate    Collection Time: 02/10/24  2:13 PM   Result Value  Ref Range    Erythrocyte Sedimentation Rate 8 0 - 20 mm/hr   Iron and iron binding capacity    Collection Time: 02/21/24 10:06 AM   Result Value Ref Range    Iron 129 37 - 145 ug/dL    Iron Binding Capacity 311 240 - 430 ug/dL    Iron Sat Index 41 15 - 46 %   CBC with platelets and differential    Collection Time: 02/21/24 10:06 AM   Result Value Ref Range    WBC Count 4.1 4.0 - 11.0 10e3/uL    RBC Count 4.33 3.80 - 5.20 10e6/uL    Hemoglobin 13.7 11.7 - 15.7 g/dL    Hematocrit 40.1 35.0 - 47.0 %    MCV 93 78 - 100 fL    MCH 31.6 26.5 - 33.0 pg    MCHC 34.2 31.5 - 36.5 g/dL    RDW 11.9 10.0 - 15.0 %    Platelet Count 236 150 - 450 10e3/uL    % Neutrophils 51 %    % Lymphocytes 42 %    % Monocytes 5 %    % Eosinophils 1 %    % Basophils 1 %    % Immature Granulocytes 0 %    NRBCs per 100 WBC 0 <1 /100    Absolute Neutrophils 2.1 1.6 - 8.3 10e3/uL    Absolute Lymphocytes 1.7 0.8 - 5.3 10e3/uL    Absolute Monocytes 0.2 0.0 - 1.3 10e3/uL    Absolute Eosinophils 0.0 0.0 - 0.7 10e3/uL    Absolute Basophils 0.0 0.0 - 0.2 10e3/uL    Absolute Immature Granulocytes 0.0 <=0.4 10e3/uL    Absolute NRBCs 0.0 10e3/uL     No results found for this or any previous visit (from the past 744 hour(s)).      Again, thank you for allowing me to participate in the care of your patient.        Sincerely,        Maya Workman MD

## 2024-02-21 NOTE — PROGRESS NOTES
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  She was seen by her PCP due to headaches, fatigue and hair loss over the past month.  Her iron stores were drawn and she had a mildly elevated serum iron but normal ferritin.  She had been given iron in the past due to low iron.  She has regular and not especially heavy menses.    There is no family history of blood disorder.  Several men have had prostate cancer.    ECOG = 0    Patient Active Problem List   Diagnosis    Mirena IUD placed 6/4/18    Irritable bowel syndrome with both constipation and diarrhea     Current Outpatient Medications   Medication    etonogestrel-ethinyl estradiol (NUVARING) 0.12-0.015 MG/24HR vaginal ring    lamoTRIgine (LAMICTAL) 100 MG tablet    MAGNESIUM CITRATE PO    traZODone (DESYREL) 50 MG tablet    UNABLE TO FIND    venlafaxine (EFFEXOR XR) 75 MG 24 hr capsule    azithromycin (ZITHROMAX) 500 MG tablet     No current facility-administered medications for this visit.     Past Medical History:   Diagnosis Date    Breast mass     Bulimia (H28) 2012    Resolved    Constipation 2017    Depression 2010    Seasonal allergies      No past surgical history on file.  Socioeconomic History    Marital status: Single   Occupational History    Occupation: Registered Nurse     Social Determinants of Health     Social Connections: Unknown (2/5/2024)    Social Connection and Isolation Panel [NHANES]     Frequency of Social Gatherings with Friends and Family: More than three times a week   Interpersonal Safety: Low Risk  (2/6/2024)    Interpersonal Safety     Do you feel physically and emotionally safe where you currently live?: Yes      "Within the past 12 months, have you been hit, slapped, kicked or otherwise physically hurt by someone?: No     Within the past 12 months, have you been humiliated or emotionally abused in other ways by your partner or ex-partner?: No     Review of Systems - Oncology    /73   Pulse 92   Resp 18   Ht 1.816 m (5' 11.5\")   Wt 65.8 kg (145 lb)   LMP 02/01/2024 (Approximate)   SpO2 99%   BMI 19.94 kg/m      Physical Exam  Vitals and nursing note reviewed.   Constitutional:       Appearance: Normal appearance. She is well-groomed and normal weight. She is not ill-appearing.      Comments: Pretty, slender, well appearing white female   HENT:      Head: Normocephalic and atraumatic.      Mouth/Throat:      Mouth: Mucous membranes are moist.      Dentition: Normal dentition. No dental caries.   Eyes:      Extraocular Movements: Extraocular movements intact.      Conjunctiva/sclera: Conjunctivae normal.      Pupils: Pupils are equal, round, and reactive to light.   Cardiovascular:      Rate and Rhythm: Normal rate and regular rhythm.      Heart sounds: Normal heart sounds.   Pulmonary:      Effort: Pulmonary effort is normal.      Breath sounds: Normal breath sounds.   Abdominal:      General: There is no distension.      Palpations: Abdomen is soft. There is no mass.      Tenderness: There is no abdominal tenderness. There is no guarding.   Musculoskeletal:         General: No deformity.      Cervical back: Normal range of motion and neck supple.      Right lower leg: No edema.      Left lower leg: No edema.   Lymphadenopathy:      Cervical: No cervical adenopathy.      Upper Body:      Right upper body: No axillary or epitrochlear adenopathy.      Left upper body: No axillary or epitrochlear adenopathy.   Skin:     General: Skin is warm and dry.   Neurological:      General: No focal deficit present.      Mental Status: She is alert and oriented to person, place, and time.      Cranial Nerves: No cranial nerve " deficit.      Motor: No weakness.      Gait: Gait normal.      Deep Tendon Reflexes: Reflexes normal.   Psychiatric:         Mood and Affect: Mood normal.         Behavior: Behavior normal. Behavior is cooperative.         Thought Content: Thought content normal.         Judgment: Judgment normal.         Recent Results (from the past 720 hour(s))   TSH with free T4 reflex    Collection Time: 02/06/24 12:36 PM   Result Value Ref Range    TSH 0.69 0.30 - 4.20 uIU/mL   CBC with platelets    Collection Time: 02/06/24 12:36 PM   Result Value Ref Range    WBC Count 4.8 4.0 - 11.0 10e3/uL    RBC Count 4.59 3.80 - 5.20 10e6/uL    Hemoglobin 14.4 11.7 - 15.7 g/dL    Hematocrit 44.0 35.0 - 47.0 %    MCV 96 78 - 100 fL    MCH 31.4 26.5 - 33.0 pg    MCHC 32.7 31.5 - 36.5 g/dL    RDW 11.8 10.0 - 15.0 %    Platelet Count 208 150 - 450 10e3/uL   Ferritin    Collection Time: 02/06/24 12:36 PM   Result Value Ref Range    Ferritin 28 6 - 175 ng/mL   Iron and iron binding capacity    Collection Time: 02/06/24 12:36 PM   Result Value Ref Range    Iron 172 (H) 37 - 145 ug/dL    Iron Binding Capacity 287 240 - 430 ug/dL    Iron Sat Index 60 (H) 15 - 46 %   Vitamin B12    Collection Time: 02/06/24 12:36 PM   Result Value Ref Range    Vitamin B12 451 232 - 1,245 pg/mL   Magnesium    Collection Time: 02/06/24 12:36 PM   Result Value Ref Range    Magnesium 2.0 1.7 - 2.3 mg/dL   Comprehensive metabolic panel    Collection Time: 02/10/24  2:13 PM   Result Value Ref Range    Sodium 139 135 - 145 mmol/L    Potassium 4.1 3.4 - 5.3 mmol/L    Carbon Dioxide (CO2) 26 22 - 29 mmol/L    Anion Gap 4 (L) 7 - 15 mmol/L    Urea Nitrogen 13.9 6.0 - 20.0 mg/dL    Creatinine 0.80 0.51 - 0.95 mg/dL    GFR Estimate >90 >60 mL/min/1.73m2    Calcium 9.3 8.6 - 10.0 mg/dL    Chloride 109 (H) 98 - 107 mmol/L    Glucose 85 70 - 99 mg/dL    Alkaline Phosphatase 42 40 - 150 U/L    AST 21 0 - 45 U/L    ALT 22 0 - 50 U/L    Protein Total 7.1 6.4 - 8.3 g/dL    Albumin  4.6 3.5 - 5.2 g/dL    Bilirubin Total 0.4 <=1.2 mg/dL   Vitamin D Deficiency    Collection Time: 02/10/24  2:13 PM   Result Value Ref Range    Vitamin D, Total (25-Hydroxy) 52 (H) 20 - 50 ng/mL   Folate    Collection Time: 02/10/24  2:13 PM   Result Value Ref Range    Folic Acid 11.1 4.6 - 34.8 ng/mL   CRP, inflammation    Collection Time: 02/10/24  2:13 PM   Result Value Ref Range    CRP Inflammation <3.00 <5.00 mg/L   ESR: Erythrocyte sedimentation rate    Collection Time: 02/10/24  2:13 PM   Result Value Ref Range    Erythrocyte Sedimentation Rate 8 0 - 20 mm/hr   Iron and iron binding capacity    Collection Time: 02/21/24 10:06 AM   Result Value Ref Range    Iron 129 37 - 145 ug/dL    Iron Binding Capacity 311 240 - 430 ug/dL    Iron Sat Index 41 15 - 46 %   CBC with platelets and differential    Collection Time: 02/21/24 10:06 AM   Result Value Ref Range    WBC Count 4.1 4.0 - 11.0 10e3/uL    RBC Count 4.33 3.80 - 5.20 10e6/uL    Hemoglobin 13.7 11.7 - 15.7 g/dL    Hematocrit 40.1 35.0 - 47.0 %    MCV 93 78 - 100 fL    MCH 31.6 26.5 - 33.0 pg    MCHC 34.2 31.5 - 36.5 g/dL    RDW 11.9 10.0 - 15.0 %    Platelet Count 236 150 - 450 10e3/uL    % Neutrophils 51 %    % Lymphocytes 42 %    % Monocytes 5 %    % Eosinophils 1 %    % Basophils 1 %    % Immature Granulocytes 0 %    NRBCs per 100 WBC 0 <1 /100    Absolute Neutrophils 2.1 1.6 - 8.3 10e3/uL    Absolute Lymphocytes 1.7 0.8 - 5.3 10e3/uL    Absolute Monocytes 0.2 0.0 - 1.3 10e3/uL    Absolute Eosinophils 0.0 0.0 - 0.7 10e3/uL    Absolute Basophils 0.0 0.0 - 0.2 10e3/uL    Absolute Immature Granulocytes 0.0 <=0.4 10e3/uL    Absolute NRBCs 0.0 10e3/uL     No results found for this or any previous visit (from the past 744 hour(s)).

## 2024-02-23 ENCOUNTER — ANCILLARY PROCEDURE (OUTPATIENT)
Dept: ULTRASOUND IMAGING | Facility: CLINIC | Age: 29
End: 2024-02-23
Attending: FAMILY MEDICINE
Payer: COMMERCIAL

## 2024-02-23 DIAGNOSIS — R10.10 UPPER ABDOMINAL PAIN: ICD-10-CM

## 2024-02-23 PROCEDURE — 76700 US EXAM ABDOM COMPLETE: CPT | Mod: TC | Performed by: RADIOLOGY
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
## 2024-11-08 ENCOUNTER — E-VISIT (OUTPATIENT)
Dept: URGENT CARE | Facility: CLINIC | Age: 29
End: 2024-11-08
Payer: COMMERCIAL

## 2024-11-08 DIAGNOSIS — R07.89 CHEST TIGHTNESS: Primary | ICD-10-CM

## 2024-11-08 PROCEDURE — 99207 PR NON-BILLABLE SERV PER CHARTING: CPT | Performed by: FAMILY MEDICINE

## 2024-11-09 ENCOUNTER — OFFICE VISIT (OUTPATIENT)
Dept: URGENT CARE | Facility: URGENT CARE | Age: 29
End: 2024-11-09
Payer: COMMERCIAL

## 2024-11-09 VITALS
DIASTOLIC BLOOD PRESSURE: 78 MMHG | SYSTOLIC BLOOD PRESSURE: 113 MMHG | TEMPERATURE: 98.3 F | HEART RATE: 91 BPM | OXYGEN SATURATION: 97 %

## 2024-11-09 DIAGNOSIS — J06.9 VIRAL URI WITH COUGH: Primary | ICD-10-CM

## 2024-11-09 PROBLEM — Z30.431 INTRAUTERINE DEVICE SURVEILLANCE: Status: RESOLVED | Noted: 2018-06-04 | Resolved: 2024-11-09

## 2024-11-09 PROBLEM — D49.3 BREAST NEOPLASM: Status: ACTIVE | Noted: 2024-11-09

## 2024-11-09 PROBLEM — F33.9 MAJOR DEPRESSIVE DISORDER, RECURRENT EPISODE (H): Status: ACTIVE | Noted: 2024-11-09

## 2024-11-09 PROCEDURE — 99213 OFFICE O/P EST LOW 20 MIN: CPT

## 2024-11-09 RX ORDER — GUAIFENESIN 600 MG/1
1200 TABLET, EXTENDED RELEASE ORAL 2 TIMES DAILY
Qty: 20 TABLET | Refills: 0 | Status: SHIPPED | OUTPATIENT
Start: 2024-11-09

## 2024-11-09 RX ORDER — BENZONATATE 100 MG/1
100 CAPSULE ORAL 3 TIMES DAILY PRN
Qty: 15 CAPSULE | Refills: 0 | Status: SHIPPED | OUTPATIENT
Start: 2024-11-09

## 2024-11-09 NOTE — LETTER
November 9, 2024      Araceli Sellers  [address] [facility] [address]        To Whom It May Concern:    Araceli Sellers  was seen on 11/9 for an upper respiratory infection.  Please excuse her until 11/14 due to illness.        Sincerely,            Fernando Bray, DO

## 2024-11-09 NOTE — PROGRESS NOTES
Assessment & Plan     Viral URI with cough  Araceli presents with symptoms consistent with viral URI. Discussed COVID, flu, and RSV testing, but after informing her it would not change my management, she declined. Will treat symptomatically with Mucinex 1200 mg BID, Tessalon, Flonase, Nyquil, Tylenol and/or ibuprofen, and honey.   - guaiFENesin (MUCINEX) 600 MG 12 hr tablet  Dispense: 20 tablet; Refill: 0  - benzonatate (TESSALON) 100 MG capsule  Dispense: 15 capsule; Refill: 0  - Use Flonase 2 sprays in each nostril every morning  - Okay to take Tylenol or ibuprofen as needed for aches  - I recommend mixing honey with warm water or tea to help soothe your throat and help with the cough   - Continue to take Nyquil as needed for cough    DO MATTEO Hightower [facility] [place]    Timothy Charles is a 29 year old female who presents to clinic today for the following health issues:  Chief Complaint   Patient presents with    Urgent Care     - 1 Week  - Cough  - Sore Throat  - Nyquil for symptoms      HPI  Araceli presents with cough, sore throat, fatigue, and chills for about 1 week.  is also sick with similar symptoms. 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 Started taking Nyquil that provided some relief.     ROS: 10 point ROS neg other than the symptoms noted above in the HPI.       Objective    /78   Pulse 91   Temp 98.3  F (36.8  C) (Oral)   SpO2 97%   Physical Exam  Vitals reviewed.   Constitutional:       General: She is not in acute distress.     Appearance: Normal appearance. She is normal weight. She is not ill-appearing or toxic-appearing.   HENT:      Head: Normocephalic and atraumatic.      Right Ear: External ear normal.      Left Ear: External ear normal.      Mouth/Throat:      Mouth: Mucous membranes are moist.   Eyes:      Extraocular Movements: Extraocular movements intact.      Conjunctiva/sclera: Conjunctivae normal.   Pulmonary:      Effort:  Pulmonary effort is normal. No respiratory distress.      Breath sounds: Normal breath sounds. No stridor. No wheezing, rhonchi or rales.   Musculoskeletal:         General: Normal range of motion.   Skin:     General: Skin is warm and dry.   Neurological:      General: No focal deficit present.      Mental Status: She is alert and oriented to person, place, and time. Mental status is at baseline.   Psychiatric:         Mood and Affect: Mood normal.         Behavior: Behavior normal.         Thought Content: Thought content normal.         Judgment: Judgment normal.

## 2024-11-09 NOTE — PATIENT INSTRUCTIONS
Thank you for coming in to see us today!    - Continue to take Nyquil as needed for cough  - Take the Mucinex 2 pills twice per day.   - Take the benzonatate up to 3 times per day as needed for cough  - Use Flonase 2 sprays in each nostril every morning  - Okay to take Tylenol or ibuprofen as needed for aches  - I recommend mixing honey with warm water or tea to help soothe your throat and help with the cough   - Follow up in 1 week if no improvement    Fernando Bray, DO

## 2024-11-09 NOTE — PATIENT INSTRUCTIONS
Dear Araceli Sellers,  Chest tightness best evaluated in person. If associated with cough or respiratory symptoms you are having, urgent care is appropriate.  Otherwise I would recommend the ER  We are sorry you are not feeling well. Based on the responses you provided, it is recommended that you be seen in-person in urgent care so we can better evaluate your symptoms. Please click here to find the nearest urgent care location to you.   You will not be charged for this Visit. Thank you for trusting us with your care.    Laurie Almeida MD

## 2025-02-02 SDOH — HEALTH STABILITY: PHYSICAL HEALTH: ON AVERAGE, HOW MANY MINUTES DO YOU ENGAGE IN EXERCISE AT THIS LEVEL?: 60 MIN

## 2025-02-02 SDOH — HEALTH STABILITY: PHYSICAL HEALTH: ON AVERAGE, HOW MANY DAYS PER WEEK DO YOU ENGAGE IN MODERATE TO STRENUOUS EXERCISE (LIKE A BRISK WALK)?: 3 DAYS

## 2025-02-02 ASSESSMENT — SOCIAL DETERMINANTS OF HEALTH (SDOH): HOW OFTEN DO YOU GET TOGETHER WITH FRIENDS OR RELATIVES?: MORE THAN THREE TIMES A WEEK

## 2025-02-05 ASSESSMENT — ASTHMA QUESTIONNAIRES
QUESTION_5 LAST FOUR WEEKS HOW WOULD YOU RATE YOUR ASTHMA CONTROL: COMPLETELY CONTROLLED
QUESTION_4 LAST FOUR WEEKS HOW OFTEN HAVE YOU USED YOUR RESCUE INHALER OR NEBULIZER MEDICATION (SUCH AS ALBUTEROL): NOT AT ALL
ACT_TOTALSCORE: 25
ACT_TOTALSCORE: 25
QUESTION_1 LAST FOUR WEEKS HOW MUCH OF THE TIME DID YOUR ASTHMA KEEP YOU FROM GETTING AS MUCH DONE AT WORK, SCHOOL OR AT HOME: NONE OF THE TIME
QUESTION_3 LAST FOUR WEEKS HOW OFTEN DID YOUR ASTHMA SYMPTOMS (WHEEZING, COUGHING, SHORTNESS OF BREATH, CHEST TIGHTNESS OR PAIN) WAKE YOU UP AT NIGHT OR EARLIER THAN USUAL IN THE MORNING: NOT AT ALL
QUESTION_2 LAST FOUR WEEKS HOW OFTEN HAVE YOU HAD SHORTNESS OF BREATH: NOT AT ALL

## 2025-02-26 SDOH — HEALTH STABILITY: PHYSICAL HEALTH: ON AVERAGE, HOW MANY MINUTES DO YOU ENGAGE IN EXERCISE AT THIS LEVEL?: 60 MIN

## 2025-02-26 SDOH — HEALTH STABILITY: PHYSICAL HEALTH: ON AVERAGE, HOW MANY DAYS PER WEEK DO YOU ENGAGE IN MODERATE TO STRENUOUS EXERCISE (LIKE A BRISK WALK)?: 3 DAYS

## 2025-02-26 ASSESSMENT — SOCIAL DETERMINANTS OF HEALTH (SDOH): HOW OFTEN DO YOU GET TOGETHER WITH FRIENDS OR RELATIVES?: MORE THAN THREE TIMES A WEEK

## 2025-03-03 ENCOUNTER — OFFICE VISIT (OUTPATIENT)
Dept: FAMILY MEDICINE | Facility: CLINIC | Age: 30
End: 2025-03-03
Payer: COMMERCIAL

## 2025-03-03 VITALS
OXYGEN SATURATION: 98 % | DIASTOLIC BLOOD PRESSURE: 80 MMHG | HEIGHT: 72 IN | RESPIRATION RATE: 12 BRPM | SYSTOLIC BLOOD PRESSURE: 113 MMHG | BODY MASS INDEX: 19.86 KG/M2 | WEIGHT: 146.6 LBS | TEMPERATURE: 98 F | HEART RATE: 85 BPM

## 2025-03-03 DIAGNOSIS — Z30.09 ENCOUNTER FOR FEMALE FAMILY PLANNING COUNSELING: ICD-10-CM

## 2025-03-03 DIAGNOSIS — D49.3 BREAST NEOPLASM: ICD-10-CM

## 2025-03-03 DIAGNOSIS — F33.0 MILD EPISODE OF RECURRENT MAJOR DEPRESSIVE DISORDER: ICD-10-CM

## 2025-03-03 DIAGNOSIS — Z71.84 TRAVEL ADVICE ENCOUNTER: ICD-10-CM

## 2025-03-03 DIAGNOSIS — F41.1 GENERALIZED ANXIETY DISORDER: ICD-10-CM

## 2025-03-03 DIAGNOSIS — Z00.00 ROUTINE GENERAL MEDICAL EXAMINATION AT A HEALTH CARE FACILITY: Primary | ICD-10-CM

## 2025-03-03 PROBLEM — K58.2 IRRITABLE BOWEL SYNDROME WITH BOTH CONSTIPATION AND DIARRHEA: Status: RESOLVED | Noted: 2019-12-31 | Resolved: 2025-03-03

## 2025-03-03 PROCEDURE — 3079F DIAST BP 80-89 MM HG: CPT

## 2025-03-03 PROCEDURE — 99395 PREV VISIT EST AGE 18-39: CPT

## 2025-03-03 PROCEDURE — 3074F SYST BP LT 130 MM HG: CPT

## 2025-03-03 RX ORDER — BUPROPION HYDROCHLORIDE 150 MG/1
TABLET ORAL
COMMUNITY
Start: 2025-02-19

## 2025-03-03 RX ORDER — VENLAFAXINE HYDROCHLORIDE 37.5 MG/1
CAPSULE, EXTENDED RELEASE ORAL
COMMUNITY
Start: 2024-12-21

## 2025-03-03 ASSESSMENT — PATIENT HEALTH QUESTIONNAIRE - PHQ9
10. IF YOU CHECKED OFF ANY PROBLEMS, HOW DIFFICULT HAVE THESE PROBLEMS MADE IT FOR YOU TO DO YOUR WORK, TAKE CARE OF THINGS AT HOME, OR GET ALONG WITH OTHER PEOPLE: SOMEWHAT DIFFICULT
SUM OF ALL RESPONSES TO PHQ QUESTIONS 1-9: 3
SUM OF ALL RESPONSES TO PHQ QUESTIONS 1-9: 3

## 2025-03-03 NOTE — PROGRESS NOTES
Preventive Care Visit  [facility]  MAYRA Fuller CNP, Nurse Practitioner Primary Care  Mar 3, 2025      Assessment & Plan     Routine general medical examination at a health care facility  Labs completed last year and normal. Will defer labs today.     Encounter for female family planning counseling  Recommended starting prenatal vitamin along along with education on how to become pregnant and what to do once patient has a positive pregnancy test    Travel advice encounter  Going to [place] in May. Reviewed CDC travel recommendations. Patient does not need malaria medication due to travel location but CDC does recommend that she complete yellow fever vaccination . Encouraged patient to follow up with travel provider to discuss this further     Breast neoplasm  In 2019 breast ultrasound BI-RADS 3 and recommended short interval follow up at 6 months. Ultrasound in 8/2020 BI-RADS 3 and recommended short interval follow up at 12 months. Patient never completed. Ultrasound ordered for follow up.  - US Breast Left Limited 1-3 Quadrants; Future    Mild episode of recurrent major depressive disorder  Stable, On wellbutrin, trazodone and venlafaxine prescribed by psychiatrist     Generalized anxiety disorder  See above     Counseling  Appropriate preventive services were addressed with this patient via screening, questionnaire, or discussion as appropriate for fall prevention, nutrition, physical activity, Tobacco-use cessation, social engagement, weight loss and cognition.  Checklist reviewing preventive services available has been given to the patient.  Reviewed patient's diet, addressing concerns and/or questions.   She is at risk for lack of exercise and has been provided with information to increase physical activity for the benefit of her well-being.   She is at risk for psychosocial distress and has been provided with information to reduce risk.       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      2/26/2025   General Health   How would you rate your overall physical health? Good   Feel stress (tense, anxious, or unable to sleep) To some extent   (!) STRESS CONCERN      2/26/2025   Nutrition   Three or more servings of calcium each day? Yes   Diet: Regular (no restrictions)   How many servings of fruit and vegetables per day? (!) 2-3   How many sweetened beverages each day? 0-1         2/26/2025   Exercise   Days per week of moderate/strenous exercise 3 days   Average minutes spent exercising at this level 60 min         2/26/2025   Social Factors   Frequency of gathering with friends or relatives More than three times a week   Worry food won't last until get money to buy more No   Food not last or not have enough money for food? No   Do you have housing? (Housing is defined as stable permanent housing and does not include staying ouside in a car, in a tent, in an abandoned building, in an overnight shelter, or couch-surfing.) Yes   Are you worried about losing your housing? No   Lack of transportation? No   Unable to get utilities (heat,electricity)? No         2/26/2025   Dental   Dentist two times every year? Yes         2/2/2025   TB Screening   Were you born outside of the [place]? No     Today's PHQ-9 Score:       3/3/2025    10:02 AM   PHQ-9 SCORE   PHQ-9 Total Score MyChart 3 (Minimal depression)   PHQ-9 Total Score 3        Patient-reported          "2/26/2025   Substance Use   Alcohol more than 3/day or more than 7/wk No   Do you use any other substances recreationally? (!) CANNABIS PRODUCTS     Social History     Tobacco Use    Smoking status: Never    Smokeless tobacco: Never   Vaping Use    Vaping status: Never Used   Substance Use Topics    Alcohol use: Yes     Alcohol/week: 1.0 standard drink of alcohol     Types: 1 Glasses of wine per week    Drug use: No     Mammogram Screening - Patient under 40 years of age: Routine Mammogram Screening not recommended.         2/26/2025   STI Screening   New sexual partner(s) since last STI/HIV test? No     History of abnormal Pap smear: No - age 30-64 HPV with reflex Pap every 5 years recommended        6/6/2019     2:23 PM 5/17/2016    12:06 PM   PAP / HPV   PAP  Negative for squamous intraepithelial lesion or malignancy  Electronically signed by Radha Wilcox CT (ASCP) on 5/26/2016 at  1:30 PM      PAP (Historical) NIL             2/26/2025   Contraception/Family Planning   Questions about contraception or family planning (!) YES discussed family planning         Reviewed and updated as needed this visit by Provider   Tobacco  Allergies  Meds  Problems  Med Hx  Surg Hx  Fam Hx                     Objective    Exam  /80 (BP Location: Left arm, Patient Position: Sitting, Cuff Size: Adult Regular)   Pulse 85   Temp 98  F (36.7  C) (Temporal)   Resp 12   Ht 1.816 m (5' 11.5\")   Wt 66.5 kg (146 lb 9.6 oz)   LMP 02/14/2025 (Exact Date)   SpO2 98%   BMI 20.16 kg/m     Estimated body mass index is 20.16 kg/m  as calculated from the following:    Height as of this encounter: 1.816 m (5' 11.5\").    Weight as of this encounter: 66.5 kg (146 lb 9.6 oz).    Physical Exam  GENERAL: alert and no distress  EYES: Eyes grossly normal to inspection, PERRL and conjunctivae and sclerae normal  HENT: ear canals and TM's normal, nose and mouth without ulcers or lesions  NECK: no adenopathy, no asymmetry, masses, " or scars  RESP: lungs clear to auscultation - no rales, rhonchi or wheezes  CV: regular rate and rhythm, normal S1 S2, no S3 or S4, no murmur, click or rub, no peripheral edema  ABDOMEN: soft, nontender, no hepatosplenomegaly, no masses and bowel sounds normal  MS: no gross musculoskeletal defects noted, no edema  SKIN: no suspicious lesions or rashes  NEURO: Normal strength and tone, mentation intact and speech normal  PSYCH: mentation appears normal, affect normal/bright        Signed Electronically by: MAYRA Fuller CNP    Answers submitted by the patient for this visit:  Patient Health Questionnaire (Submitted on 3/3/2025)  If you checked off any problems, how difficult have these problems made it for you to do your work, take care of things at home, or get along with other people?: Somewhat difficult  PHQ9 TOTAL SCORE: 3

## 2025-03-03 NOTE — PATIENT INSTRUCTIONS
https://wwwnc.cdc.gov/travel/destinations/traveler/[place]?s_cid=[email or web address]-travel-[email or web address]       Patient Education   Preventive Care Advice   This is general advice given by our system to help you stay healthy. However, your care team may have specific advice just for you. Please talk to your care team about your preventive care needs.  Nutrition  Eat 5 or more servings of fruits and vegetables each day.  Try wheat bread, brown rice and whole grain pasta (instead of white bread, rice, and pasta).  Get enough calcium and vitamin D. Check the label on foods and aim for 100% of the RDA (recommended daily allowance).  Lifestyle  Exercise at least 150 minutes each week  (30 minutes a day, 5 days a week).  Do muscle strengthening activities 2 days a week. These help control your weight and prevent disease.  No smoking.  Wear sunscreen to prevent skin cancer.  Have a dental exam and cleaning every 6 months.  Yearly exams  See your health care team every year to talk about:  Any changes in your health.  Any medicines your care team has prescribed.  Preventive care, family planning, and ways to prevent chronic diseases.  Shots (vaccines)   HPV shots (up to age 26), if you've never had them before.  Hepatitis B shots (up to age 59), if you've never had them before.  COVID-19 shot: Get this shot when it's due.  Flu shot: Get a flu shot every year.  Tetanus shot: Get a tetanus shot every 10 years.  Pneumococcal, hepatitis A, and RSV shots: Ask your care team if you need these based on your risk.  Shingles shot (for age 50 and up)  General health tests  Diabetes screening:  Starting at age 35, Get screened for diabetes at least every 3 years.  If you are younger than age 35, ask your care team if you should be screened for diabetes.  Cholesterol test: At age 39, start having a cholesterol test every 5 years, or more often if advised.  Bone density scan (DEXA): At age 50, ask your care team if you should have this scan for  osteoporosis (brittle bones).  Hepatitis C: Get tested at least once in your life.  STIs (sexually transmitted infections)  Before age 24: Ask your care team if you should be screened for STIs.  After age 24: Get screened for STIs if you're at risk. You are at risk for STIs (including HIV) if:  You are sexually active with more than one person.  You don't use condoms every time.  You or a partner was diagnosed with a sexually transmitted infection.  If you are at risk for HIV, ask about PrEP medicine to prevent HIV.  Get tested for HIV at least once in your life, whether you are at risk for HIV or not.  Cancer screening tests  Cervical cancer screening: If you have a cervix, begin getting regular cervical cancer screening tests starting at age 21.  Breast cancer scan (mammogram): If you've ever had breasts, begin having regular mammograms starting at age 40. This is a scan to check for breast cancer.  Colon cancer screening: It is important to start screening for colon cancer at age 45.  Have a colonoscopy test every 10 years (or more often if you're at risk) Or, ask your provider about stool tests like a FIT test every year or Cologuard test every 3 years.  To learn more about your testing options, visit:   .  For help making a decision, visit:   https://bit.ly/hl97231.  Prostate cancer screening test: If you have a prostate, ask your care team if a prostate cancer screening test (PSA) at age 55 is right for you.  Lung cancer screening: If you are a current or former smoker ages 50 to 80, ask your care team if ongoing lung cancer screenings are right for you.  For informational purposes only. Not to replace the advice of your health care provider. Copyright   2023 [employer]. All rights reserved. Clinically reviewed by the [facility]. Violet Grey [id number] - REV 01/24.

## 2025-03-10 ENCOUNTER — ANCILLARY PROCEDURE (OUTPATIENT)
Dept: MAMMOGRAPHY | Facility: CLINIC | Age: 30
End: 2025-03-10
Payer: COMMERCIAL

## 2025-03-10 DIAGNOSIS — D49.3 BREAST NEOPLASM: ICD-10-CM

## 2025-03-10 PROCEDURE — 76642 ULTRASOUND BREAST LIMITED: CPT | Mod: LT

## 2025-03-10 PROCEDURE — 77066 DX MAMMO INCL CAD BI: CPT
